# Patient Record
Sex: MALE | Race: ASIAN | NOT HISPANIC OR LATINO | URBAN - METROPOLITAN AREA
[De-identification: names, ages, dates, MRNs, and addresses within clinical notes are randomized per-mention and may not be internally consistent; named-entity substitution may affect disease eponyms.]

---

## 2020-07-29 RX ORDER — AZTREONAM 2 G
1 VIAL (EA) INJECTION
Qty: 0 | Refills: 0 | DISCHARGE
Start: 2020-07-29 | End: 2020-08-05

## 2020-08-02 ENCOUNTER — INPATIENT (INPATIENT)
Facility: HOSPITAL | Age: 85
LOS: 3 days | Discharge: HOME | DRG: 644 | End: 2020-08-06
Attending: HOSPITALIST | Admitting: HOSPITALIST
Payer: MEDICARE

## 2020-08-02 VITALS
WEIGHT: 145.06 LBS | RESPIRATION RATE: 20 BRPM | HEIGHT: 66 IN | SYSTOLIC BLOOD PRESSURE: 164 MMHG | TEMPERATURE: 98 F | HEART RATE: 89 BPM | OXYGEN SATURATION: 97 % | DIASTOLIC BLOOD PRESSURE: 76 MMHG

## 2020-08-02 DIAGNOSIS — I10 ESSENTIAL (PRIMARY) HYPERTENSION: ICD-10-CM

## 2020-08-02 DIAGNOSIS — N40.0 BENIGN PROSTATIC HYPERPLASIA WITHOUT LOWER URINARY TRACT SYMPTOMS: ICD-10-CM

## 2020-08-02 DIAGNOSIS — Z02.9 ENCOUNTER FOR ADMINISTRATIVE EXAMINATIONS, UNSPECIFIED: ICD-10-CM

## 2020-08-02 DIAGNOSIS — E87.1 HYPO-OSMOLALITY AND HYPONATREMIA: ICD-10-CM

## 2020-08-02 DIAGNOSIS — Z29.9 ENCOUNTER FOR PROPHYLACTIC MEASURES, UNSPECIFIED: ICD-10-CM

## 2020-08-02 LAB
ALBUMIN SERPL ELPH-MCNC: 4 G/DL — SIGNIFICANT CHANGE UP (ref 3.3–5)
ALP SERPL-CCNC: 59 U/L — SIGNIFICANT CHANGE UP (ref 40–120)
ALT FLD-CCNC: 14 U/L — SIGNIFICANT CHANGE UP (ref 10–45)
ANION GAP SERPL CALC-SCNC: 15 MMOL/L — SIGNIFICANT CHANGE UP (ref 5–17)
ANION GAP SERPL CALC-SCNC: 16 MMOL/L — SIGNIFICANT CHANGE UP (ref 5–17)
ANION GAP SERPL CALC-SCNC: 17 MMOL/L — SIGNIFICANT CHANGE UP (ref 5–17)
ANION GAP SERPL CALC-SCNC: 17 MMOL/L — SIGNIFICANT CHANGE UP (ref 5–17)
APPEARANCE UR: CLEAR — SIGNIFICANT CHANGE UP
AST SERPL-CCNC: 23 U/L — SIGNIFICANT CHANGE UP (ref 10–40)
BACTERIA # UR AUTO: NEGATIVE — SIGNIFICANT CHANGE UP
BASOPHILS # BLD AUTO: 0.02 K/UL — SIGNIFICANT CHANGE UP (ref 0–0.2)
BASOPHILS NFR BLD AUTO: 0.2 % — SIGNIFICANT CHANGE UP (ref 0–2)
BILIRUB SERPL-MCNC: 0.6 MG/DL — SIGNIFICANT CHANGE UP (ref 0.2–1.2)
BILIRUB UR-MCNC: NEGATIVE — SIGNIFICANT CHANGE UP
BUN SERPL-MCNC: 16 MG/DL — SIGNIFICANT CHANGE UP (ref 7–23)
BUN SERPL-MCNC: 16 MG/DL — SIGNIFICANT CHANGE UP (ref 7–23)
BUN SERPL-MCNC: 17 MG/DL — SIGNIFICANT CHANGE UP (ref 7–23)
BUN SERPL-MCNC: 20 MG/DL — SIGNIFICANT CHANGE UP (ref 7–23)
CALCIUM SERPL-MCNC: 8.3 MG/DL — LOW (ref 8.4–10.5)
CALCIUM SERPL-MCNC: 8.5 MG/DL — SIGNIFICANT CHANGE UP (ref 8.4–10.5)
CALCIUM SERPL-MCNC: 8.7 MG/DL — SIGNIFICANT CHANGE UP (ref 8.4–10.5)
CALCIUM SERPL-MCNC: 8.7 MG/DL — SIGNIFICANT CHANGE UP (ref 8.4–10.5)
CHLORIDE SERPL-SCNC: 80 MMOL/L — LOW (ref 96–108)
CHLORIDE SERPL-SCNC: 81 MMOL/L — LOW (ref 96–108)
CHLORIDE SERPL-SCNC: 85 MMOL/L — LOW (ref 96–108)
CHLORIDE SERPL-SCNC: 87 MMOL/L — LOW (ref 96–108)
CO2 SERPL-SCNC: 16 MMOL/L — LOW (ref 22–31)
CO2 SERPL-SCNC: 17 MMOL/L — LOW (ref 22–31)
CO2 SERPL-SCNC: 17 MMOL/L — LOW (ref 22–31)
CO2 SERPL-SCNC: 18 MMOL/L — LOW (ref 22–31)
COLOR SPEC: SIGNIFICANT CHANGE UP
CREAT SERPL-MCNC: 1.19 MG/DL — SIGNIFICANT CHANGE UP (ref 0.5–1.3)
CREAT SERPL-MCNC: 1.26 MG/DL — SIGNIFICANT CHANGE UP (ref 0.5–1.3)
CREAT SERPL-MCNC: 1.33 MG/DL — HIGH (ref 0.5–1.3)
CREAT SERPL-MCNC: 1.7 MG/DL — HIGH (ref 0.5–1.3)
DIFF PNL FLD: NEGATIVE — SIGNIFICANT CHANGE UP
EOSINOPHIL # BLD AUTO: 0.01 K/UL — SIGNIFICANT CHANGE UP (ref 0–0.5)
EOSINOPHIL NFR BLD AUTO: 0.1 % — SIGNIFICANT CHANGE UP (ref 0–6)
EPI CELLS # UR: 0 /HPF — SIGNIFICANT CHANGE UP
GLUCOSE SERPL-MCNC: 104 MG/DL — HIGH (ref 70–99)
GLUCOSE SERPL-MCNC: 108 MG/DL — HIGH (ref 70–99)
GLUCOSE SERPL-MCNC: 116 MG/DL — HIGH (ref 70–99)
GLUCOSE SERPL-MCNC: 143 MG/DL — HIGH (ref 70–99)
GLUCOSE UR QL: NEGATIVE — SIGNIFICANT CHANGE UP
HCT VFR BLD CALC: 35.9 % — LOW (ref 39–50)
HGB BLD-MCNC: 13.2 G/DL — SIGNIFICANT CHANGE UP (ref 13–17)
HYALINE CASTS # UR AUTO: 0 /LPF — SIGNIFICANT CHANGE UP (ref 0–2)
IMM GRANULOCYTES NFR BLD AUTO: 0.7 % — SIGNIFICANT CHANGE UP (ref 0–1.5)
KETONES UR-MCNC: NEGATIVE — SIGNIFICANT CHANGE UP
LEUKOCYTE ESTERASE UR-ACNC: NEGATIVE — SIGNIFICANT CHANGE UP
LYMPHOCYTES # BLD AUTO: 1.22 K/UL — SIGNIFICANT CHANGE UP (ref 1–3.3)
LYMPHOCYTES # BLD AUTO: 15 % — SIGNIFICANT CHANGE UP (ref 13–44)
MAGNESIUM SERPL-MCNC: 1.6 MG/DL — SIGNIFICANT CHANGE UP (ref 1.6–2.6)
MCHC RBC-ENTMCNC: 31.2 PG — SIGNIFICANT CHANGE UP (ref 27–34)
MCHC RBC-ENTMCNC: 36.8 GM/DL — HIGH (ref 32–36)
MCV RBC AUTO: 84.9 FL — SIGNIFICANT CHANGE UP (ref 80–100)
MONOCYTES # BLD AUTO: 0.58 K/UL — SIGNIFICANT CHANGE UP (ref 0–0.9)
MONOCYTES NFR BLD AUTO: 7.1 % — SIGNIFICANT CHANGE UP (ref 2–14)
NEUTROPHILS # BLD AUTO: 6.23 K/UL — SIGNIFICANT CHANGE UP (ref 1.8–7.4)
NEUTROPHILS NFR BLD AUTO: 76.9 % — SIGNIFICANT CHANGE UP (ref 43–77)
NITRITE UR-MCNC: NEGATIVE — SIGNIFICANT CHANGE UP
NRBC # BLD: 0 /100 WBCS — SIGNIFICANT CHANGE UP (ref 0–0)
OSMOLALITY SERPL: 244 MOSMOL/KG — LOW (ref 280–301)
OSMOLALITY UR: 521 MOS/KG — SIGNIFICANT CHANGE UP (ref 300–900)
PH UR: 7 — SIGNIFICANT CHANGE UP (ref 5–8)
PHOSPHATE SERPL-MCNC: 2.2 MG/DL — LOW (ref 2.5–4.5)
PLATELET # BLD AUTO: 307 K/UL — SIGNIFICANT CHANGE UP (ref 150–400)
POTASSIUM SERPL-MCNC: 4.5 MMOL/L — SIGNIFICANT CHANGE UP (ref 3.5–5.3)
POTASSIUM SERPL-MCNC: 4.6 MMOL/L — SIGNIFICANT CHANGE UP (ref 3.5–5.3)
POTASSIUM SERPL-MCNC: 4.7 MMOL/L — SIGNIFICANT CHANGE UP (ref 3.5–5.3)
POTASSIUM SERPL-MCNC: 4.8 MMOL/L — SIGNIFICANT CHANGE UP (ref 3.5–5.3)
POTASSIUM SERPL-SCNC: 4.5 MMOL/L — SIGNIFICANT CHANGE UP (ref 3.5–5.3)
POTASSIUM SERPL-SCNC: 4.6 MMOL/L — SIGNIFICANT CHANGE UP (ref 3.5–5.3)
POTASSIUM SERPL-SCNC: 4.7 MMOL/L — SIGNIFICANT CHANGE UP (ref 3.5–5.3)
POTASSIUM SERPL-SCNC: 4.8 MMOL/L — SIGNIFICANT CHANGE UP (ref 3.5–5.3)
POTASSIUM UR-SCNC: 46 MMOL/L — SIGNIFICANT CHANGE UP
PROT SERPL-MCNC: 6.7 G/DL — SIGNIFICANT CHANGE UP (ref 6–8.3)
PROT UR-MCNC: SIGNIFICANT CHANGE UP
RBC # BLD: 4.23 M/UL — SIGNIFICANT CHANGE UP (ref 4.2–5.8)
RBC # FLD: 11.3 % — SIGNIFICANT CHANGE UP (ref 10.3–14.5)
RBC CASTS # UR COMP ASSIST: 5 /HPF — HIGH (ref 0–4)
SARS-COV-2 RNA SPEC QL NAA+PROBE: SIGNIFICANT CHANGE UP
SODIUM SERPL-SCNC: 113 MMOL/L — CRITICAL LOW (ref 135–145)
SODIUM SERPL-SCNC: 115 MMOL/L — CRITICAL LOW (ref 135–145)
SODIUM SERPL-SCNC: 118 MMOL/L — CRITICAL LOW (ref 135–145)
SODIUM SERPL-SCNC: 120 MMOL/L — CRITICAL LOW (ref 135–145)
SODIUM UR-SCNC: 128 MMOL/L — SIGNIFICANT CHANGE UP
SP GR SPEC: 1.02 — SIGNIFICANT CHANGE UP (ref 1.01–1.02)
TROPONIN T, HIGH SENSITIVITY RESULT: 10 NG/L — SIGNIFICANT CHANGE UP (ref 0–51)
UROBILINOGEN FLD QL: NEGATIVE — SIGNIFICANT CHANGE UP
WBC # BLD: 8.12 K/UL — SIGNIFICANT CHANGE UP (ref 3.8–10.5)
WBC # FLD AUTO: 8.12 K/UL — SIGNIFICANT CHANGE UP (ref 3.8–10.5)
WBC UR QL: 0 /HPF — SIGNIFICANT CHANGE UP (ref 0–5)

## 2020-08-02 PROCEDURE — 70450 CT HEAD/BRAIN W/O DYE: CPT | Mod: 26

## 2020-08-02 PROCEDURE — 99223 1ST HOSP IP/OBS HIGH 75: CPT | Mod: GC

## 2020-08-02 PROCEDURE — 99291 CRITICAL CARE FIRST HOUR: CPT

## 2020-08-02 PROCEDURE — 93010 ELECTROCARDIOGRAM REPORT: CPT

## 2020-08-02 PROCEDURE — 99222 1ST HOSP IP/OBS MODERATE 55: CPT | Mod: GC

## 2020-08-02 PROCEDURE — 71045 X-RAY EXAM CHEST 1 VIEW: CPT | Mod: 26

## 2020-08-02 RX ORDER — TAMSULOSIN HYDROCHLORIDE 0.4 MG/1
0.4 CAPSULE ORAL AT BEDTIME
Refills: 0 | Status: DISCONTINUED | OUTPATIENT
Start: 2020-08-02 | End: 2020-08-06

## 2020-08-02 RX ORDER — SODIUM CHLORIDE 9 MG/ML
1000 INJECTION INTRAMUSCULAR; INTRAVENOUS; SUBCUTANEOUS ONCE
Refills: 0 | Status: COMPLETED | OUTPATIENT
Start: 2020-08-02 | End: 2020-08-02

## 2020-08-02 RX ORDER — DESMOPRESSIN ACETATE 0.1 MG/1
2 TABLET ORAL ONCE
Refills: 0 | Status: COMPLETED | OUTPATIENT
Start: 2020-08-02 | End: 2020-08-02

## 2020-08-02 RX ORDER — AMLODIPINE BESYLATE 2.5 MG/1
5 TABLET ORAL DAILY
Refills: 0 | Status: DISCONTINUED | OUTPATIENT
Start: 2020-08-02 | End: 2020-08-06

## 2020-08-02 RX ORDER — FINASTERIDE 5 MG/1
5 TABLET, FILM COATED ORAL DAILY
Refills: 0 | Status: DISCONTINUED | OUTPATIENT
Start: 2020-08-02 | End: 2020-08-06

## 2020-08-02 RX ORDER — ONDANSETRON 8 MG/1
4 TABLET, FILM COATED ORAL ONCE
Refills: 0 | Status: COMPLETED | OUTPATIENT
Start: 2020-08-02 | End: 2020-08-02

## 2020-08-02 RX ORDER — ENOXAPARIN SODIUM 100 MG/ML
40 INJECTION SUBCUTANEOUS DAILY
Refills: 0 | Status: DISCONTINUED | OUTPATIENT
Start: 2020-08-02 | End: 2020-08-06

## 2020-08-02 RX ORDER — SODIUM CHLORIDE 9 MG/ML
1000 INJECTION, SOLUTION INTRAVENOUS
Refills: 0 | Status: DISCONTINUED | OUTPATIENT
Start: 2020-08-02 | End: 2020-08-03

## 2020-08-02 RX ORDER — ACETAMINOPHEN 500 MG
650 TABLET ORAL ONCE
Refills: 0 | Status: COMPLETED | OUTPATIENT
Start: 2020-08-02 | End: 2020-08-02

## 2020-08-02 RX ORDER — SENNA PLUS 8.6 MG/1
2 TABLET ORAL AT BEDTIME
Refills: 0 | Status: DISCONTINUED | OUTPATIENT
Start: 2020-08-02 | End: 2020-08-06

## 2020-08-02 RX ORDER — POLYETHYLENE GLYCOL 3350 17 G/17G
17 POWDER, FOR SOLUTION ORAL DAILY
Refills: 0 | Status: DISCONTINUED | OUTPATIENT
Start: 2020-08-02 | End: 2020-08-06

## 2020-08-02 RX ADMIN — DESMOPRESSIN ACETATE 2 MICROGRAM(S): 0.1 TABLET ORAL at 21:33

## 2020-08-02 RX ADMIN — ENOXAPARIN SODIUM 40 MILLIGRAM(S): 100 INJECTION SUBCUTANEOUS at 15:49

## 2020-08-02 RX ADMIN — FINASTERIDE 5 MILLIGRAM(S): 5 TABLET, FILM COATED ORAL at 15:50

## 2020-08-02 RX ADMIN — ONDANSETRON 4 MILLIGRAM(S): 8 TABLET, FILM COATED ORAL at 04:38

## 2020-08-02 RX ADMIN — SODIUM CHLORIDE 200 MILLILITER(S): 9 INJECTION, SOLUTION INTRAVENOUS at 22:41

## 2020-08-02 RX ADMIN — AMLODIPINE BESYLATE 5 MILLIGRAM(S): 2.5 TABLET ORAL at 15:50

## 2020-08-02 RX ADMIN — Medication 650 MILLIGRAM(S): at 05:05

## 2020-08-02 RX ADMIN — TAMSULOSIN HYDROCHLORIDE 0.4 MILLIGRAM(S): 0.4 CAPSULE ORAL at 21:34

## 2020-08-02 RX ADMIN — SODIUM CHLORIDE 1000 MILLILITER(S): 9 INJECTION INTRAMUSCULAR; INTRAVENOUS; SUBCUTANEOUS at 04:38

## 2020-08-02 NOTE — ED PROVIDER NOTE - PROGRESS NOTE DETAILS
Valentin: received emergent call from lab regarding VBG sodium of 109. Pt stable. Waiting for CMP results to confirm. In CT. Valentin: MICU aware of patient and at bedside. Will be checking urine electrolytes and osmolarity to determine underlying nature of hypoNa. Dispo pending treatment and response. Attending Radha:  micu consulted d/t level of hyponatremia, plan to repeat labs, and re evaluate. Resident: Jerardo Doherty Pt signed out to me pending

## 2020-08-02 NOTE — CONSULT NOTE ADULT - ASSESSMENT
82 year old Swedish-speaking male ([place]  [id number]) with a PMH of HTN and BPH presenting with dizziness, nausea and insomnia for about 10 days found to be hyponatremic.    #Hyponatremia - high urine sodium low serum osmolality consistent with SIADH which could be 2/2 nausea vs. urinary retention vs. Bactrim (some case reports). Neuro status is normal. Improved symptoms.   - Fluid Restrict 1L  - BMPs Q4H, would repeat BMP in 4 hours and then assess if needs more IVF (responded well to 1L 113 -> 115) after 1L NS  - Renal Consult  - Work up for urinary retention continue Sellers as drained 900cc   - Would avoid Bactrim if possible      Patient does not require ICU level of care. Please reconsult as needed. Thank You.    d/w attending     Julian Sebastian MD  Internal Medicine PGY-3  MICU Consult 82 year old Amharic-speaking male ([place]  [id number]) with a PMH of HTN and BPH presenting with dizziness, nausea and insomnia for about 10 days found to be hyponatremic.    #Hyponatremia - high urine sodium low serum osmolality consistent with SIADH which could be 2/2 nausea vs. urinary retention vs. Bactrim (some case reports). Neuro status is normal. Improved symptoms.   - Fluid Restrict 1L  - BMPs Q4H, would repeat BMP in 4 hours and then assess if needs more IVF (responded well to 1L 113 -> 115) after 1L NS  - Goal correction rate 6-8 mEq/L over 24 hours  - Recommend renal consult   - Work up for urinary retention continue Sellers as drained 900cc   - Would avoid Bactrim if possible      Patient does not require ICU level of care. Please reconsult as needed. Thank You.    d/w attending     Julian Sebastian MD  Internal Medicine PGY-3  MICU Consult 84 year old Slovak-speaking male ([place]  [id number]) with a PMH of HTN and BPH presenting with dizziness, nausea and insomnia for about 10 days found to be hyponatremic.    #Hyponatremia - high urine sodium low serum osmolality consistent with SIADH which could be 2/2 nausea vs. urinary retention vs. Bactrim (some case reports). Neuro status is normal. Improved symptoms.   - Fluid Restrict 1L  - BMPs Q4H, would repeat BMP in 4 hours and then assess if needs more IVF (responded well to 1L 113 -> 115) after 1L NS  - Goal correction rate 6-8 mEq/L over 24 hours  - Recommend renal consult   - Work up for urinary retention continue Sellers as drained 900cc   - Would avoid Bactrim if possible  - Would do a full med rec (unclear which BP medication he is on)      Patient does not require ICU level of care. Please reconsult as needed. Thank You.    d/w attending     Julian Sebastian MD  Internal Medicine PGY-3  MICU Consult

## 2020-08-02 NOTE — ED ADULT NURSE REASSESSMENT NOTE - NS ED NURSE REASSESS COMMENT FT1
Patient A+OX3, sitting up in stretcher in no apparent distress. NSR on cardiac monitor. Breathing spontaneous and unlabored on RA. Skin warm pink and dry. Reports dizziness. Patient placed in hospital gown, red socks applied. Patient educated that urine sample is needed. MD Pina at bedside to perform ultrasound, approx. 714 cc of urine noted, sutton catheter to be ordered. Patient states he has not voided since approx. 10pm last night. Patient educated and verbalized understanding of call bell use. Call bell within reach. Bed in lowest position, side rails up.

## 2020-08-02 NOTE — CONSULT NOTE ADULT - PROBLEM SELECTOR RECOMMENDATION 9
Hyponatremia euvolemia likely 2/2 SIADH in the setting of nausea, vomiting and bactrim use.  On admission serum sodium 113, serum osmolality 244, Uosm 529, Gina 46 which consistent with SIADH.  Otherwise sCr 1.26 and urinalysis trace protein with rbc.  Patient was given IVF NS with sNa uptrend to 115 mEq.  CXR and CTH showed no acute process (no pulm/cns etiology induced SIADH)    Recommend Hyponatremia euvolemia likely 2/2 SIADH in the setting of nausea, vomiting and bactrim use.  On admission serum sodium 113, serum osmolality 244, Uosm 529, Gina 46 which consistent with SIADH.  Otherwise sCr 1.26 and urinalysis trace protein with rbc.  Patient was given IVF NS with sNa uptrend to 115 mEq.  CXR and CTH showed no acute process (no pulm/cns etiology induced SIADH)    Recommend  Fluid restriction 1L per day  please recheck BMP given increase UOP post sutton  if serum Na >120 will need hypertonic saline 2% (pending repeat lab)  avoid hypotonic fluid including D5W, hold further bactrim, avoid NSAIDs  monitor serum Na q4hrs for now  avoid rapid correction goal 6-8 mEq/24hrs

## 2020-08-02 NOTE — ED ADULT NURSE REASSESSMENT NOTE - NS ED NURSE REASSESS COMMENT FT1
Patient home medications placed in sealed valuable bag and brought to main pharmacy. Patient educated and aware that medications sealed in valuable bag and brought to main pharmacy.

## 2020-08-02 NOTE — H&P ADULT - NSHPSOCIALHISTORY_GEN_ALL_CORE
Lives alone. No tobacco, ETOH use, drug use.  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 Lives alone and is able to perform all ADLs by himself. No tobacco, ETOH use, drug use.  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 Wife passed away 2 years ago, [place]. Lives alone and is able to perform all ADLs by himself.

## 2020-08-02 NOTE — H&P ADULT - NSHPLABSRESULTS_GEN_ALL_CORE
LABS:                          13.2   8.12  )-----------( 307      ( 02 Aug 2020 04:57 )             35.9     08-02    115<LL>  |  81<L>  |  16  ----------------------------<  116<H>  4.8   |  17<L>  |  1.19    Ca    8.3<L>      02 Aug 2020 09:49  Phos  2.2     08-  Mg     1.6     08-    TPro  6.7  /  Alb  4.0  /  TBili  0.6  /  DBili  x   /  AST  23  /  ALT  14  /  AlkPhos  59  08-      Urinalysis Basic - ( 02 Aug 2020 09:49 )    Color: Light Yellow / Appearance: Clear / S.019 / pH: x  Gluc: x / Ketone: Negative  / Bili: Negative / Urobili: Negative   Blood: x / Protein: Trace / Nitrite: Negative   Leuk Esterase: Negative / RBC: 5 /hpf / WBC 0 /HPF   Sq Epi: x / Non Sq Epi: 0 /hpf / Bacteria: Negative      CAPILLARY BLOOD GLUCOSE      POCT Blood Glucose.: 128 mg/dL (02 Aug 2020 05:06)    Osmolality, Random Urine (. @ 09:49)    Osmolality, Random Urine: 521 mos/kg    Sodium, Random Urine (. @ 09:49)    Sodium, Random Urine: 128: Reference Ranges have NOT been established for random urine analytes due  to variability in fluid intake and concentration. mmol/L    Osmolality, Serum (20 @ 10:07)    Osmolality, Serum: 244 mosmol/kg        RADIOLOGY/ADDITIONAL TESTS:    < from: CT Head No Cont (20 @ 05:55) >    IMPRESSION:  Motion limited study. No evidence of acute intracranial process.      < end of copied text >        EKG: LABS:                          13.2   8.12  )-----------( 307      ( 02 Aug 2020 04:57 )             35.9     08-02    115<LL>  |  81<L>  |  16  ----------------------------<  116<H>  4.8   |  17<L>  |  1.19    Ca    8.3<L>      02 Aug 2020 09:49  Phos  2.2     08-  Mg     1.6     08-    TPro  6.7  /  Alb  4.0  /  TBili  0.6  /  DBili  x   /  AST  23  /  ALT  14  /  AlkPhos  59  08-      Urinalysis Basic - ( 02 Aug 2020 09:49 )    Color: Light Yellow / Appearance: Clear / S.019 / pH: x  Gluc: x / Ketone: Negative  / Bili: Negative / Urobili: Negative   Blood: x / Protein: Trace / Nitrite: Negative   Leuk Esterase: Negative / RBC: 5 /hpf / WBC 0 /HPF   Sq Epi: x / Non Sq Epi: 0 /hpf / Bacteria: Negative      CAPILLARY BLOOD GLUCOSE      POCT Blood Glucose.: 128 mg/dL (02 Aug 2020 05:06)    Osmolality, Random Urine (. @ 09:49)    Osmolality, Random Urine: 521 mos/kg    Sodium, Random Urine (. @ 09:49)    Sodium, Random Urine: 128: Reference Ranges have NOT been established for random urine analytes due  to variability in fluid intake and concentration. mmol/L    Osmolality, Serum (20 @ 10:07)    Osmolality, Serum: 244 mosmol/kg        RADIOLOGY/ADDITIONAL TESTS:    < from: CT Head No Cont (20 @ 05:55) >    IMPRESSION:  Motion limited study. No evidence of acute intracranial process.      < end of copied text >      EKG: LABS:                          13.2   8.12  )-----------( 307      ( 02 Aug 2020 04:57 )             35.9     08-02    115<LL>  |  81<L>  |  16  ----------------------------<  116<H>  4.8   |  17<L>  |  1.19    Ca    8.3<L>      02 Aug 2020 09:49  Phos  2.2     08-  Mg     1.6     08-    TPro  6.7  /  Alb  4.0  /  TBili  0.6  /  DBili  x   /  AST  23  /  ALT  14  /  AlkPhos  59  08-      Urinalysis Basic - ( 02 Aug 2020 09:49 )    Color: Light Yellow / Appearance: Clear / S.019 / pH: x  Gluc: x / Ketone: Negative  / Bili: Negative / Urobili: Negative   Blood: x / Protein: Trace / Nitrite: Negative   Leuk Esterase: Negative / RBC: 5 /hpf / WBC 0 /HPF   Sq Epi: x / Non Sq Epi: 0 /hpf / Bacteria: Negative      CAPILLARY BLOOD GLUCOSE      POCT Blood Glucose.: 128 mg/dL (02 Aug 2020 05:06)    Osmolality, Random Urine (20 @ 09:49)    Osmolality, Random Urine: 521 mos/kg    Sodium, Random Urine (20 @ 09:49)    Sodium, Random Urine: 128: Reference Ranges have NOT been established for random urine analytes due  to variability in fluid intake and concentration. mmol/L    Osmolality, Serum (20 @ 10:07)    Osmolality, Serum: 244 mosmol/kg        RADIOLOGY/ADDITIONAL TESTS:    < from: CT Head No Cont (20 @ 05:55) >    IMPRESSION:  Motion limited study. No evidence of acute intracranial process.      < end of copied text >    < from: Xray Chest 1 View AP/PA (20 @ 05:11) >    FINDINGS:  The heart size is unremarkable.  The lungs are clear. No pleural effusion or pneumothorax.  The visualized osseous structures demonstrate no acute pathology.    IMPRESSION:  Clear lungs.    < end of copied text >        EKG:

## 2020-08-02 NOTE — H&P ADULT - ASSESSMENT
85 yo Malay-speaking M PMH of HTN, BPH presenting with dizziness, nausea and insomnia for 10 days, recently prescribed Bactrim by PMD for urinary retention. Found to have Na 113, admitted for hyponatremia. 83 yo Maori-speaking M PMH of HTN, BPH presenting with dizziness, nausea and insomnia for 10 days, recently prescribed Bactrim by PMD for urinary retention. Found to have Na 113, admitted for hyponatremia.

## 2020-08-02 NOTE — ED ADULT NURSE REASSESSMENT NOTE - NS ED NURSE REASSESS COMMENT FT1
pt appears to be in no acute distress. VSS. pt awaiting CT results. pt made aware must provide urine sample, pt understands. safety maintained.

## 2020-08-02 NOTE — H&P ADULT - PROBLEM SELECTOR PLAN 3
Recently prescribed Bactrim by PMD for urinary retention  - on sutton Recently prescribed Bactrim by PMD for urinary retention. Found to be retaining in ED w/ bladder scan 700'sml, sutton placed.  - cont sutton Recently prescribed Bactrim by PMD for urinary retention. Found to be retaining in ED w/ bladder scan 700'sml, sutton placed.  - cont sutton   - on home tamsulosin and finasteride

## 2020-08-02 NOTE — ED PROVIDER NOTE - SEVERE SEPSIS ALERT DETAILS
Lactate elevation unlikely to be due to infection given lack of localizing infectious symptoms or fever.

## 2020-08-02 NOTE — H&P ADULT - NSHPPHYSICALEXAM_GEN_ALL_CORE
Vital Signs Last 24 Hrs  T(C): 36.8 (02 Aug 2020 12:00), Max: 36.9 (02 Aug 2020 03:44)  T(F): 98.3 (02 Aug 2020 12:00), Max: 98.5 (02 Aug 2020 03:44)  HR: 71 (02 Aug 2020 12:00) (71 - 89)  BP: 129/66 (02 Aug 2020 12:00) (123/69 - 164/76)  BP(mean): --  RR: 16 (02 Aug 2020 12:00) (16 - 20)  SpO2: 98% (02 Aug 2020 12:00) (97% - 100%)    PHYSICAL EXAM:  GENERAL: NAD, lying in bed comfortably  HEENT:  NCAT, supple neck  CHEST/LUNG: Clear to auscultation bilaterally; No rales, rhonchi, wheezing, or rubs. Unlabored respirations  HEART: Regular rate and rhythm; S1, S2 present. No murmurs, rubs, or gallops  ABDOMEN: Bowel sounds present; Soft, Nontender, Nondistended.   EXTREMITIES:  No pedal edema  NERVOUS SYSTEM:  Alert & Oriented X3, speech clear. No deficits   MSK: FROM all 4 extremities, full and equal strength

## 2020-08-02 NOTE — H&P ADULT - PROBLEM SELECTOR PLAN 1
Na 113 s/p 1L NS in ED, now 115. Likely 2/2 SIADH given high urine sodium, high urine osmol and low serum osmol, possibly in setting of recent rx Bactrim.  - fluid restrict 1L. Goal correction rate 6-8 mEq/L over 24 hrs.   - Monitor BMP  - renal consult, appreciate recs Na 113 s/p 1L NS in ED, now 115. Likely 2/2 SIADH given high urine sodium, high urine osmol and low serum osmol, possibly in setting of recent rx Bactrim.  - fluid restrict 1L. Goal correction rate 6-8 mEq/L over 24 hrs.   - Monitor BMP  - renal consulted, appreciate recs Na 113 s/p 1L NS in ED, now 115. Likely mulfactorial 2/2 diuretics vs. SIADH given high urine sodium, high urine osmol and low serum osmol, possibly in setting of recent rx Bactrim.  - fluid restrict 1L. Goal correction rate 6-8 mEq/L over 24 hrs.   - Monitor BMP q6  - renal consulted, appreciate recs

## 2020-08-02 NOTE — H&P ADULT - HISTORY OF PRESENT ILLNESS
83 yo Yi-speaking male ([place]  ) with PMH HTN, BPH presenting with dizziness, nausea and insomnia for 10 days duration. Also reports a 6/10 intermittent headache bilaterally on the frontal side. Has had some nausea with one episode of NBNB vomiting. Dizziness reports "as if he is spinning" but denies any other central symptoms such as tinnitus or hearing loss. Denies fevers/chills, chest pain, shortness of breath or abdominal pain. Of note he had difficulty urinating and PMD started patient on Bactrim. Denies dysuria. Has had decreased PO intake due to the nausea but has been drinking 3-4 bottles of water everyday.     ED Course:  98.5F, HR 89, 164/76, RR 20, SpO2 97% RA  s/p 1L NS bolus, Na 113 -->115  given zofran and tylenol 85 yo Urdu-speaking male (RN provided translation) with PMH HTN, BPH presenting with dizziness, nausea and insomnia for 10 days duration. Also reports a 6/10 intermittent headache bilaterally on the frontal side. Has had some nausea with one episode of NBNB vomiting. Dizziness reports "as if he is spinning" but denies any tinnitus or hearing loss. Denies fevers/chills, chest pain, shortness of breath or abdominal pain. Of note he had difficulty urinating and PMD started patient on Bactrim 2 days ago. Accidently took 2 doses yesterday. Denies dysuria, reports voiding every 2 hrs while at home. Has had decreased PO intake due to the nausea but has been drinking 3-4 [employer] bottles everyday. Pt now reports headache and nausea have improved.    ED Course:  98.5F, HR 89, 164/76, RR 20, SpO2 97% RA  s/p 1L NS bolus, Na 113 -->115  given zofran and tylenol 85 yo Vietnamese-speaking male (RN provided translation) with PMH HTN, BPH presenting with dizziness, nausea and insomnia for 10 days duration. Also reports a 6/10 intermittent headache bilaterally on the frontal side. Has had some nausea with one episode of NBNB vomiting. Dizziness reports "as if he is spinning" but denies any tinnitus or hearing loss. Denies fevers/chills, chest pain, shortness of breath or abdominal pain. Of note he had difficulty urinating and PMD started patient on Bactrim 2 days ago. Accidently took 2 doses yesterday. Denies any past hospitalizations. Denies dysuria, reports voiding every 2 hrs while at home. Has had decreased PO intake due to the nausea but has been drinking 3-4 [employer] spring bottles everyday. Pt now reports headache and nausea have improved.    ED Course:  98.5F, HR 89, 164/76, RR 20, SpO2 97% RA  Found to be hyponatremic to 113, s/p 1L NS bolus, Na 113 -->115  given zofran and tylenol

## 2020-08-02 NOTE — H&P ADULT - PROBLEM SELECTOR PLAN 2
Was taking losartan-HCTZ at home  - hold thiazide for now given hyponatremia   - on amlodipine 5mg daily  - Monitor BP

## 2020-08-02 NOTE — H&P ADULT - NSHPREVIEWOFSYSTEMS_GEN_ALL_CORE
CONSTITUTIONAL: No fever, no chills  EYES: No eye pain, no visual disturbance  Mouth: no pain in mouth, no cuts  RESPIRATORY: No cough, No sob  CARDIOVASCULAR: No CP, no palpitations  GASTROINTESTINAL: no abdominal pain, no n/v/d  GENITOURINARY: No dysuria, no hematuria  NEUROLOGICAL: No headaches, no weakness  SKIN: No itching, no rashes  MUSCULOSKELETAL: No joint pain, no joint swelling CONSTITUTIONAL: No fever, no chills  RESPIRATORY: No cough, No sob  CARDIOVASCULAR: No CP, no palpitations  GASTROINTESTINAL: no abdominal pain  GENITOURINARY: No dysuria, no hematuria  MUSCULOSKELETAL: No joint pain, no joint swelling

## 2020-08-02 NOTE — CONSULT NOTE ADULT - SUBJECTIVE AND OBJECTIVE BOX
CHIEF COMPLAINT:    HPI:    PAST MEDICAL & SURGICAL HISTORY:  HTN (hypertension)      FAMILY HISTORY:      SOCIAL HISTORY:  Smoking: __ packs x ___ years  EtOH Use:  Marital Status:  Occupation:  Recent Travel:  Country of Birth:  Advance Directives:    Allergies    No Known Allergies    Intolerances        HOME MEDICATIONS:    REVIEW OF SYSTEMS:  Constitutional:   Eyes:  ENT:  CV:  Resp:  GI:  :  MSK:  Integumentary:  Neurological:  Psychiatric:  Endocrine:  Hematologic/Lymphatic:  Allergic/Immunologic:  [ ] All other systems negative  [ ] Unable to assess ROS because ________    OBJECTIVE:  ICU Vital Signs Last 24 Hrs  T(C): 36.9 (02 Aug 2020 07:16), Max: 36.9 (02 Aug 2020 03:44)  T(F): 98.4 (02 Aug 2020 07:16), Max: 98.5 (02 Aug 2020 03:44)  HR: 72 (02 Aug 2020 10:00) (72 - 89)  BP: 123/69 (02 Aug 2020 10:00) (123/69 - 164/76)  BP(mean): --  ABP: --  ABP(mean): --  RR: 17 (02 Aug 2020 10:00) (16 - 20)  SpO2: 97% (02 Aug 2020 10:00) (97% - 100%)        CAPILLARY BLOOD GLUCOSE      POCT Blood Glucose.: 128 mg/dL (02 Aug 2020 05:06)      PHYSICAL EXAM:  General:   HEENT:   Lymph Nodes:  Neck:   Respiratory:   Cardiovascular:   Abdomen:   Extremities:   Skin:   Neurological:  Psychiatry:    HOSPITAL MEDICATIONS:  MEDICATIONS  (STANDING):    MEDICATIONS  (PRN):      LABS:                        13.2   8.12  )-----------( 307      ( 02 Aug 2020 04:57 )             35.9     08-02    115<LL>  |  81<L>  |  16  ----------------------------<  116<H>  4.8   |  17<L>  |  1.19    Ca    8.3<L>      02 Aug 2020 09:49  Phos  2.2     08-02  Mg     1.6     08-02    TPro  6.7  /  Alb  4.0  /  TBili  0.6  /  DBili  x   /  AST  23  /  ALT  14  /  AlkPhos  59  08-02      Urinalysis Basic - ( 02 Aug 2020 09:49 )    Color: Light Yellow / Appearance: Clear / S.019 / pH: x  Gluc: x / Ketone: Negative  / Bili: Negative / Urobili: Negative   Blood: x / Protein: Trace / Nitrite: Negative   Leuk Esterase: Negative / RBC: 5 /hpf / WBC 0 /HPF   Sq Epi: x / Non Sq Epi: 0 /hpf / Bacteria: Negative        Venous Blood Gas:   @ 05:03  7.41/34/40/21/74  VBG Lactate: 2.8      MICROBIOLOGY:     RADIOLOGY:  [ ] Reviewed and interpreted by me    EKG: HPI:  Mr. Chen is a 82 year old Upper sorbian-speaking male ([place]  [id number]) with a PMH of HTN and BPH presenting with dizziness, nausea and insomnia for about 10 days. Patient reports has also been having a 6/10 headache bilaterally on the frontal side that is intermittent. Has had some nausea with one episode of NBNB vomiting. Dizziness reports is "as if he is spinning" but denies any other central symptoms such as tinnitus or hearing loss. Denies fevers/chills, chest pain, shortness of breath or abdominal pain. Has also had difficulty urinating PMD started patient on Bactrim. Denies dysuria. Has had decreased PO intake due to the nausea but has been drinking 3-4 bottles of water everyday. Denies history of heart failure or diuretic use. No history of seizures. Lives home alone. For home medications on Finasteride, Bactrim currently, and Ambien. Unsure which BP medication he takes.        PAST MEDICAL & SURGICAL HISTORY:  HTN (hypertension)  BPH    No surgical history      FAMILY HISTORY:      SOCIAL HISTORY:  Smoking: Denies  EtOH Use: Denies  Marital Status:  Occupation:  Recent Travel:  Country of Birth:  Advance Directives:    Allergies    No Known Allergies    Intolerances        HOME MEDICATIONS:  Finasteride  Ambien  BP med?    REVIEW OF SYSTEMS:  Constitutional: no fevers/chills   Eyes:  ENT:  CV: no chest pain or palpitations  Resp: no cough or shortness of breath  GI: +nausea no abdominal pain  : +difficulty with urinating no dysuria   MSK:  Integumentary:  Neurological: +headache +dizziness   Psychiatric:  Endocrine: no weight loss or weight gain  Hematologic/Lymphatic:  Allergic/Immunologic:  [X ] All other systems negative  [ ] Unable to assess ROS because ________    OBJECTIVE:  ICU Vital Signs Last 24 Hrs  T(C): 36.9 (02 Aug 2020 07:16), Max: 36.9 (02 Aug 2020 03:44)  T(F): 98.4 (02 Aug 2020 07:16), Max: 98.5 (02 Aug 2020 03:44)  HR: 72 (02 Aug 2020 10:00) (72 - 89)  BP: 123/69 (02 Aug 2020 10:00) (123/69 - 164/76)  BP(mean): --  ABP: --  ABP(mean): --  RR: 17 (02 Aug 2020 10:00) (16 - 20)  SpO2: 97% (02 Aug 2020 10:00) (97% - 100%)        CAPILLARY BLOOD GLUCOSE      POCT Blood Glucose.: 128 mg/dL (02 Aug 2020 05:06)      PHYSICAL EXAM:  General: NAD  HEENT: NC/AT, EOMI   Neck: Supple   Respiratory:   Cardiovascular:   Abdomen:   Extremities:   Skin:   Neurological:  Psychiatry:    HOSPITAL MEDICATIONS:  MEDICATIONS  (STANDING):    MEDICATIONS  (PRN):      LABS:                        13.2   8.12  )-----------( 307      ( 02 Aug 2020 04:57 )             35.9     08-02    115<LL>  |  81<L>  |  16  ----------------------------<  116<H>  4.8   |  17<L>  |  1.19    Ca    8.3<L>      02 Aug 2020 09:49  Phos  2.2     08-  Mg     1.6     08-02    TPro  6.7  /  Alb  4.0  /  TBili  0.6  /  DBili  x   /  AST  23  /  ALT  14  /  AlkPhos  59  08-02      Urinalysis Basic - ( 02 Aug 2020 09:49 )    Color: Light Yellow / Appearance: Clear / S.019 / pH: x  Gluc: x / Ketone: Negative  / Bili: Negative / Urobili: Negative   Blood: x / Protein: Trace / Nitrite: Negative   Leuk Esterase: Negative / RBC: 5 /hpf / WBC 0 /HPF   Sq Epi: x / Non Sq Epi: 0 /hpf / Bacteria: Negative        Venous Blood Gas:   @ 05:03  7.41/34/40/21/74  VBG Lactate: 2.8      MICROBIOLOGY:     RADIOLOGY:  [ ] Reviewed and interpreted by me    EKG: HPI:  Mr. Chen is a 82 year old Albanian-speaking male ([place]  [id number]) with a PMH of HTN and BPH presenting with dizziness, nausea and insomnia for about 10 days. Patient reports has also been having a 6/10 headache bilaterally on the frontal side that is intermittent. Has had some nausea with one episode of NBNB vomiting. Dizziness reports is "as if he is spinning" but denies any other central symptoms such as tinnitus or hearing loss. Denies fevers/chills, chest pain, shortness of breath or abdominal pain. Has also had difficulty urinating PMD started patient on Bactrim. Denies dysuria. Has had decreased PO intake due to the nausea but has been drinking 3-4 bottles of water everyday. Denies history of heart failure or diuretic use. No history of seizures. Lives home alone. For home medications on Finasteride, Bactrim currently, and Ambien. Unsure which BP medication he takes.        PAST MEDICAL & SURGICAL HISTORY:  HTN (hypertension)  BPH    No surgical history      FAMILY HISTORY:      SOCIAL HISTORY:  Smoking: Denies  EtOH Use: Denies  Marital Status:  Occupation:  Recent Travel:  Country of Birth:  Advance Directives:    Allergies    No Known Allergies    Intolerances        HOME MEDICATIONS:  Finasteride  Ambien  BP med?    REVIEW OF SYSTEMS:  Constitutional: no fevers/chills   Eyes:  ENT:  CV: no chest pain or palpitations  Resp: no cough or shortness of breath  GI: +nausea no abdominal pain  : +difficulty with urinating no dysuria   MSK:  Integumentary:  Neurological: +headache +dizziness   Psychiatric:  Endocrine: no weight loss or weight gain  Hematologic/Lymphatic:  Allergic/Immunologic:  [X ] All other systems negative  [ ] Unable to assess ROS because ________    OBJECTIVE:  ICU Vital Signs Last 24 Hrs  T(C): 36.9 (02 Aug 2020 07:16), Max: 36.9 (02 Aug 2020 03:44)  T(F): 98.4 (02 Aug 2020 07:16), Max: 98.5 (02 Aug 2020 03:44)  HR: 72 (02 Aug 2020 10:00) (72 - 89)  BP: 123/69 (02 Aug 2020 10:00) (123/69 - 164/76)  BP(mean): --  ABP: --  ABP(mean): --  RR: 17 (02 Aug 2020 10:00) (16 - 20)  SpO2: 97% (02 Aug 2020 10:00) (97% - 100%)        CAPILLARY BLOOD GLUCOSE      POCT Blood Glucose.: 128 mg/dL (02 Aug 2020 05:06)      PHYSICAL EXAM:  General: NAD  HEENT: NC/AT, EOMI   Neck: Supple   Respiratory: CTA B/L   Cardiovascular: RRR S1+ S2+ No murmurs  Abdomen: Soft nontender/nondistended good bowel sounds  : +Sellers   Extremities: 2+ peripheral pulses; no LE edema  Skin: No rash  Neurological: A/O x 3 (name, location, date). No focal deficits   Psychiatry: [facility] MEDICATIONS:  MEDICATIONS  (STANDING):    MEDICATIONS  (PRN):      LABS:                        13.2   8.12  )-----------( 307      ( 02 Aug 2020 04:57 )             35.9     08-02    115<LL>  |  81<L>  |  16  ----------------------------<  116<H>  4.8   |  17<L>  |  1.19    Ca    8.3<L>      02 Aug 2020 09:49  Phos  2.2     08-02  Mg     1.6     08-02    TPro  6.7  /  Alb  4.0  /  TBili  0.6  /  DBili  x   /  AST  23  /  ALT  14  /  AlkPhos  59  08-02      Urinalysis Basic - ( 02 Aug 2020 09:49 )    Color: Light Yellow / Appearance: Clear / S.019 / pH: x  Gluc: x / Ketone: Negative  / Bili: Negative / Urobili: Negative   Blood: x / Protein: Trace / Nitrite: Negative   Leuk Esterase: Negative / RBC: 5 /hpf / WBC 0 /HPF   Sq Epi: x / Non Sq Epi: 0 /hpf / Bacteria: Negative        Venous Blood Gas:   @ 05:03  7.41/34/40/21/74  VBG Lactate: 2.8      MICROBIOLOGY:     RADIOLOGY:  [ X] Reviewed and interpreted by me HPI:  Mr. Chen is a 84 year old Chinese-speaking male ([place]  [id number]) with a PMH of HTN and BPH presenting with dizziness, nausea and insomnia for about 10 days. Patient reports has also been having a 6/10 headache bilaterally on the frontal side that is intermittent. Has had some nausea with one episode of NBNB vomiting. Dizziness reports is "as if he is spinning" but denies any other central symptoms such as tinnitus or hearing loss. Denies fevers/chills, chest pain, shortness of breath or abdominal pain. Has also had difficulty urinating PMD started patient on Bactrim. Denies dysuria. Has had decreased PO intake due to the nausea but has been drinking 3-4 bottles of water everyday. Denies history of heart failure or diuretic use. No history of seizures. Lives home alone. For home medications on Finasteride, Bactrim currently, and Ambien. Unsure which BP medication he takes.        PAST MEDICAL & SURGICAL HISTORY:  HTN (hypertension)  BPH    No surgical history      FAMILY HISTORY:      SOCIAL HISTORY:  Smoking: Denies  EtOH Use: Denies  Marital Status:  Occupation:  Recent Travel:  Country of Birth:  Advance Directives:    Allergies    No Known Allergies    Intolerances        HOME MEDICATIONS:  Finasteride  Ambien  BP med?    REVIEW OF SYSTEMS:  Constitutional: no fevers/chills   Eyes:  ENT:  CV: no chest pain or palpitations  Resp: no cough or shortness of breath  GI: +nausea no abdominal pain  : +difficulty with urinating no dysuria   MSK:  Integumentary:  Neurological: +headache +dizziness   Psychiatric:  Endocrine: no weight loss or weight gain  Hematologic/Lymphatic:  Allergic/Immunologic:  [X ] All other systems negative  [ ] Unable to assess ROS because ________    OBJECTIVE:  ICU Vital Signs Last 24 Hrs  T(C): 36.9 (02 Aug 2020 07:16), Max: 36.9 (02 Aug 2020 03:44)  T(F): 98.4 (02 Aug 2020 07:16), Max: 98.5 (02 Aug 2020 03:44)  HR: 72 (02 Aug 2020 10:00) (72 - 89)  BP: 123/69 (02 Aug 2020 10:00) (123/69 - 164/76)  BP(mean): --  ABP: --  ABP(mean): --  RR: 17 (02 Aug 2020 10:00) (16 - 20)  SpO2: 97% (02 Aug 2020 10:00) (97% - 100%)        CAPILLARY BLOOD GLUCOSE      POCT Blood Glucose.: 128 mg/dL (02 Aug 2020 05:06)      PHYSICAL EXAM:  General: NAD  HEENT: NC/AT, EOMI   Neck: Supple   Respiratory: CTA B/L   Cardiovascular: RRR S1+ S2+ No murmurs  Abdomen: Soft nontender/nondistended good bowel sounds  : +Sellers   Extremities: 2+ peripheral pulses; no LE edema  Skin: No rash  Neurological: A/O x 3 (name, location, date). No focal deficits   Psychiatry: [facility] MEDICATIONS:  MEDICATIONS  (STANDING):    MEDICATIONS  (PRN):      LABS:                        13.2   8.12  )-----------( 307      ( 02 Aug 2020 04:57 )             35.9     08-02    115<LL>  |  81<L>  |  16  ----------------------------<  116<H>  4.8   |  17<L>  |  1.19    Ca    8.3<L>      02 Aug 2020 09:49  Phos  2.2     08-02  Mg     1.6     08-02    TPro  6.7  /  Alb  4.0  /  TBili  0.6  /  DBili  x   /  AST  23  /  ALT  14  /  AlkPhos  59  08-02      Urinalysis Basic - ( 02 Aug 2020 09:49 )    Color: Light Yellow / Appearance: Clear / S.019 / pH: x  Gluc: x / Ketone: Negative  / Bili: Negative / Urobili: Negative   Blood: x / Protein: Trace / Nitrite: Negative   Leuk Esterase: Negative / RBC: 5 /hpf / WBC 0 /HPF   Sq Epi: x / Non Sq Epi: 0 /hpf / Bacteria: Negative        Venous Blood Gas:   @ 05:03  7.41/34/40/21/74  VBG Lactate: 2.8      MICROBIOLOGY:     RADIOLOGY:  [ X] Reviewed and interpreted by me

## 2020-08-02 NOTE — ED PROVIDER NOTE - NS ED ROS FT
Constitutional: (-) fever (+) vomiting  Eyes/ENT: (-) vision changes, (-) hearing changes  Cardiovascular: (-) chest pain, (-) wheezing  Respiratory: (-) cough, (-) shortness of breath  Gastrointestinal: (+) vomiting, (-) diarrhea, (-) abdominal pain  : (-) dysuria   Musculoskeletal: (-) back pain  Integumentary: (-) rash, (-) edema  Neurological: (-)loc  Allergic/Immunologic: (-) pruritus  Endocrine: No history of thyroid disease

## 2020-08-02 NOTE — ED ADULT NURSE NOTE - OBJECTIVE STATEMENT
84 YOM A&OX3 Telugu speaking only with pmh of HTN & BPH presents to ED for dizziness and headache of 2 days. pt states was started on ambien 1 week ago and has been feeling dizziness from medication. pt complains of headache rated 6/10. pt states has never fell or hit head while taking ambien. upon assessment pt able to move all 4 extremities, pulses presents equal and presents in all 4 extremities, pt able to smile, no facial droop noted, pupils PERRL +3 bilaterally.  pt denies sob, chest pain, n/v/d, blurry vision. safety maintained.

## 2020-08-02 NOTE — ED PROVIDER NOTE - PHYSICAL EXAMINATION
Vitals: I have reviewed the patients vital signs  General: well appearing, well nourished, no acute distress  HEENT: atraumatic, normocephalic, airway patent, EOMI and appropriate tracking  Neck: no JVD, no tracheal deviation, no goiter  Chest/Lungs: no trauma, symmetric chest rise, lung sounds clear bilaterally, speaking in complete sentences  Heart: Regular rate, regular rhythm, S1S2, no MRG, skin well perfused  Abdomen: Soft and nontender throughout, no pulsatile mass, no bruising  Neuro: A+Ox3, CN II-XII intact, speech coherent and non dysarthric, finger-nose testing normal. Muscle strength at baseline in all extremities  Eyes: PERRL, EOMI  MSK: all limbs at baseline strength, no wasting or atrophy, noted muscle twitching on bilateral quadriceps, pt states is normal for him.   Skin: no bleeding, no cyanosis, no new emergent lesions

## 2020-08-02 NOTE — ED PROVIDER NOTE - ATTENDING CONTRIBUTION TO CARE
MD Garcia:  patient seen and evaluated personally.   I agree with the History & Physical,  Impression & Plan other than what was detailed in my note.  MD Garcia  85 y/o m presnting w/ 2 weeks of lightheadedness, now having nausea, and vomiting, also pos ha, no f/c, unilateral weakness, no abd pain, urinary symptoms, weight changes. afebrile vitals stable, no distress, pleassant, power 5/5x 4, cn 2-xii gi, no cerebellar dysfunction. mildly dry appearing. plan for ct head, cbc, cmp, urinalysis, ekg. trop re evaluate.

## 2020-08-02 NOTE — ED PROVIDER NOTE - CLINICAL SUMMARY MEDICAL DECISION MAKING FREE TEXT BOX
83 y/o Sami speaking male hx HTN with 2 weeks of persistent lightheadedness, N/V, and headache. Not triggered or relieved by anything in particular. Exam nonfocal for lateralizing lesions, minimal concern for posterior circulation stroke given symptoms and exam. Etiology unsure, will check cbc, cmp, vbg, ekg, cxr, ct head to rule out structural, metabolic, or other causes. Disposition pending results. Attempt at symptomatic relief with NS and zofran. Reassess.    Translation provided via video service on tablet.    states pt poor historian, alert and oriented but not fully understanding questions regarding his PMH

## 2020-08-02 NOTE — CONSULT NOTE ADULT - SUBJECTIVE AND OBJECTIVE BOX
[facility] DIVISION OF KIDNEY DISEASES AND HYPERTENSION   -- INITIAL CONSULT NOTE --  Efe Lua  Nephrology Fellow  NS Pager: [phone] / DELMA Pager: [phone]  After 5pm or on weekend, please page the on-call fellow)  --------------------------------------------------------------------------------    HPI:  84M Wolof-speaking ([place] ) with PMHx HTN and BPH who present to ED for dizziness, nausea for 10 days - admitted for hyponatremia (115) with lab consistent for SIADH.    Patient report symptoms started about 10 days ago and are accompanied by insomnia, headache (b/l frontal, intermittent), NBNB vomiting and decrease PO intake.  Pt was recently prescribed bactrim for difficulty urination by PMD.  Otherwise pt denies any fever, chills, chest pain, sob, abdominal pain, excess fluid intake, diuretic, diabetic meds (sulfonylurea).      Nephrology consulted for hyponatremia.  On admission serum sodium 113, serum osmolality 244, Uosm 529, Gina 46 which consistent with SIADH.  Otherwise sCr 1.26, serum glucose 140s and urinalysis trace protein with rbc.  Patient was given IVF NS with sNa uptrend to 115 mEq.  CXR and CTH showed no acute process.  MICU consulted however deemed doesn't require ICU care.      PAST HISTORY  --------------------------------------------------------------------------------  PAST MEDICAL & SURGICAL HISTORY:  BPH (benign prostatic hyperplasia)  HTN (hypertension)  No significant past surgical history    FAMILY HISTORY:  Family history unknown    PAST SOCIAL HISTORY:  ALLERGIES & MEDICATIONS  --------------------------------------------------------------------------------  Allergies    No Known Allergies    Intolerances    Standing Inpatient Medications    PRN Inpatient Medications    REVIEW OF SYSTEMS  All other systems were reviewed and are negative, except as noted.    VITALS/PHYSICAL EXAM  --------------------------------------------------------------------------------  T(C): 37.1 (08-02-20 @ 14:11), Max: 37.1 (08-02-20 @ 14:11)  HR: 76 (08-02-20 @ 14:11) (71 - 89)  BP: 138/72 (08-02-20 @ 14:11) (123/69 - 164/76)  RR: 20 (08-02-20 @ 14:11) (16 - 20)  SpO2: 98% (08-02-20 @ 12:00) (97% - 100%)  Wt(kg): --  Height (cm): 168 (08-02-20 @ 14:11)  Weight (kg): 65.7 (08-02-20 @ 14:11)  BMI (kg/m2): 23.3 (08-02-20 @ 14:11)  BSA (m2): 1.75 (08-02-20 @ 14:11)    Physical Exam:        LABS/STUDIES  --------------------------------------------------------------------------------                13.2   8.12  >-----------<  307      [08-02-20 @ 04:57]              35.9     115  |  81  |  16  ----------------------------<  116      [08-02-20 @ 09:49]  4.8   |  17  |  1.19        Ca     8.3     [08-02-20 @ 09:49]      Mg     1.6     [08-02-20 @ 04:57]      Phos  2.2     [08-02-20 @ 04:57]    TPro  6.7  /  Alb  4.0  /  TBili  0.6  /  DBili  x   /  AST  23  /  ALT  14  /  AlkPhos  59  [08-02-20 @ 04:57]    Serum Osmolality 244      [08-02-20 @ 10:07]    Creatinine Trend:  SCr 1.19 [08-02 @ 09:49]  SCr 1.26 [08-02 @ 04:57]    Urinalysis - [08-02-20 @ 09:49]      Color Light Yellow / Appearance Clear / SG 1.019 / pH 7.0      Gluc Negative / Ketone Negative  / Bili Negative / Urobili Negative       Blood Negative / Protein Trace / Leuk Est Negative / Nitrite Negative      RBC 5 / WBC 0 / Hyaline 0 / Gran  / Sq Epi  / Non Sq Epi 0 / Bacteria Negative    Urine Sodium 128      [08-02-20 @ 09:49]  Urine Potassium 46      [08-02-20 @ 09:49]  Urine Osmolality 521      [08-02-20 @ 09:49] [facility] DIVISION OF KIDNEY DISEASES AND HYPERTENSION   -- INITIAL CONSULT NOTE --  Efe Lau  Nephrology Fellow  NS Pager: [phone] / DELMA Pager: [phone]  After 5pm or on weekend, please page the on-call fellow)  --------------------------------------------------------------------------------    HPI:  84M Turkish-speaking ([place]  and RN) with PMHx HTN and BPH who present to ED for dizziness, nausea for 10 days - admitted for hyponatremia (115) with lab consistent for SIADH.    Patient report symptoms started about 10 days ago and are accompanied by insomnia, headache (b/l frontal, intermittent), NBNB vomiting and decrease PO intake.  Pt was recently prescribed bactrim for difficulty urination by PMD.  Otherwise pt denies any fever, chills, chest pain, sob, abdominal pain, excess fluid intake, diuretic, diabetic meds (sulfonylurea).      Nephrology consulted for hyponatremia.  On admission serum sodium 113, serum osmolality 244, Uosm 529, Gina 46 which consistent with SIADH.  Otherwise sCr 1.26, serum glucose 140s and urinalysis trace protein with rbc.  Patient was given IVF NS with sNa uptrend to 115 mEq.  CXR and CTH showed no acute process.  MICU consulted however deemed doesn't require ICU care.      Pt seen and examined at bedside currently asymptomatic report headache/dizziness resolve.    PAST HISTORY  --------------------------------------------------------------------------------  PAST MEDICAL & SURGICAL HISTORY:  BPH (benign prostatic hyperplasia)  HTN (hypertension)  No significant past surgical history    FAMILY HISTORY:  Family history unknown    PAST SOCIAL HISTORY:  ALLERGIES & MEDICATIONS  --------------------------------------------------------------------------------  Allergies    No Known Allergies    Intolerances    Standing Inpatient Medications    PRN Inpatient Medications    REVIEW OF SYSTEMS  Gen: no fever, chills, weakness  Respiratory: No dyspnea, cough  CV: No chest pain, orthopnea  GI: No abdominal pain, nausea, vomiting, diarrhea  MSK: no edema  Neuro: No dizziness, lightheadedness  Heme: No bleeding  All other systems were reviewed and are negative, except as noted.    VITALS/PHYSICAL EXAM  --------------------------------------------------------------------------------  T(C): 37.1 (08-02-20 @ 14:11), Max: 37.1 (08-02-20 @ 14:11)  HR: 76 (08-02-20 @ 14:11) (71 - 89)  BP: 138/72 (08-02-20 @ 14:11) (123/69 - 164/76)  RR: 20 (08-02-20 @ 14:11) (16 - 20)  SpO2: 98% (08-02-20 @ 12:00) (97% - 100%)  Wt(kg): --  Height (cm): 168 (08-02-20 @ 14:11)  Weight (kg): 65.7 (08-02-20 @ 14:11)  BMI (kg/m2): 23.3 (08-02-20 @ 14:11)  BSA (m2): 1.75 (08-02-20 @ 14:11)    Physical Exam:  	Gen: NAD, well-appearing on room air  	HEENT: moist mucous membrane, no JVD, sclera clear  	Pulm: CTA B/L, no crackles  	CV: RRR, S1S2; no rub/murmur  	GI: +BS, soft, nontender/nondistended  	: sutton catheter w/ clear urine  	MSK: Warm, no edema              Neuro: AAOx3  	Psych: Normal affect and mood  	Skin: Warm, no cyanosis    LABS/STUDIES  --------------------------------------------------------------------------------                13.2   8.12  >-----------<  307      [08-02-20 @ 04:57]              35.9     115  |  81  |  16  ----------------------------<  116      [08-02-20 @ 09:49]  4.8   |  17  |  1.19        Ca     8.3     [08-02-20 @ 09:49]      Mg     1.6     [08-02-20 @ 04:57]      Phos  2.2     [08-02-20 @ 04:57]    TPro  6.7  /  Alb  4.0  /  TBili  0.6  /  DBili  x   /  AST  23  /  ALT  14  /  AlkPhos  59  [08-02-20 @ 04:57]    Serum Osmolality 244      [08-02-20 @ 10:07]    Creatinine Trend:  SCr 1.19 [08-02 @ 09:49]  SCr 1.26 [08-02 @ 04:57]    Urinalysis - [08-02-20 @ 09:49]      Color Light Yellow / Appearance Clear / SG 1.019 / pH 7.0      Gluc Negative / Ketone Negative  / Bili Negative / Urobili Negative       Blood Negative / Protein Trace / Leuk Est Negative / Nitrite Negative      RBC 5 / WBC 0 / Hyaline 0 / Gran  / Sq Epi  / Non Sq Epi 0 / Bacteria Negative    Urine Sodium 128      [08-02-20 @ 09:49]  Urine Potassium 46      [08-02-20 @ 09:49]  Urine Osmolality 521      [08-02-20 @ 09:49]

## 2020-08-02 NOTE — ED ADULT NURSE NOTE - NS ED NURSE REPORT GIVEN TO FT
5 julio RN Insook, receiving RN Insook aware that patient home medications were placed in valuable bag and brought to main pharmacy

## 2020-08-02 NOTE — H&P ADULT - ATTENDING COMMENTS
83 yo Turkish-speaking M PMH of HTN, BPH presenting with dizziness, nausea and insomnia for 10 days, recently prescribed Bactrim by PMD for urinary retention. 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 Pt with mild improvement from normal saline.     PLAN:  -pt appears clinically well, asymptomatic at this time  -close monitoring of BMP with Na q4 h at this time  -fluid restriction to 1 L  -4pm Na of 118 improved from 113. Goal for first 24 hours is 119-121. Will withhold adding hypertonic saline for now to avoid overcorrection and follow up 8pm BMP. May ultimately require addition of hypertonic saline  -appreciate nephro cs.    Plan discussed with pt and his son at bedside.

## 2020-08-02 NOTE — ED ADULT NURSE REASSESSMENT NOTE - NS ED NURSE REASSESS COMMENT FT1
Sellers catheter placed by ESTRELLITA Hernandez per verbal order by MD Pina. successful on first attempt. Urine sample sent per order. Sellers drained approx. 900cc of clear, yellow urine. Bed in lowest position, side rails up.

## 2020-08-02 NOTE — CONSULT NOTE ADULT - ATTENDING COMMENTS
Patient seen and examined in ED. Patient with history of HTN and BPH. He is unsure of his medications but recently started having urinary trouble and was given a prescription for Bactrim. Subsequent to this he developed significant nausea and discomfort. He now presents to ED with feeling unwell and dizziness and is found to have hyponatremia. He did not have any reported seizures and he is mentating well.    1. Hyponatremia - in the setting of suspected SIADH. Continue BMP Q4. Would suggest fluid restriction at this time. Consider nephrology evaluation.  2. Mental status - intact. Patient is coherent and without any acute changes. I spoke with him via [employer]  [id number] today.  3. Pulmonary status - stable on room air. No respiratory distress.    Patient does not require ICU level care at this time. Please reconsult as needed
Hyponatremia: with s/o headaches, dizziness consistent with symptomatic hyponatremia    His urine lytes and urine osmolality are suggestive of SAIDH although given an increase in serum sodium with just NS goes against it  Now s/p sutton placement with significant u/o ( appears very clear)  Would check a stat BMP for a repeat sodium. Its possible that given the increased urine output his sodium will continue to increase  If repeat sodium is stable to lower, will start 2% saline  Given his high urine sodium/Urine K he is less likely to respond to just fluid restriction alone  He will need bmp to be checked every 3 hours      Yeni Arriola MD  O: [phone]  C: [phone]

## 2020-08-02 NOTE — H&P ADULT - PROBLEM SELECTOR PLAN 5
Dispo:   1.  Name of PCP:   2.  PCP Contacted on Admission: [ ] Y    [ ] N    3.  PCP contacted at Discharge: [ ] Y    [ ] N    [ ] N/A  4.  Post-Discharge Appointment Date and Location:  5.  Summary of Handoff given to PCP: Dispo:   1.  Name of PCP: Leonila Acuña  2.  PCP Contacted on Admission: [ ] Y    [x] N    3.  PCP contacted at Discharge: [ ] Y    [ ] N    [ ] N/A  4.  Post-Discharge Appointment Date and Location:  5.  Summary of Handoff given to PCP:

## 2020-08-02 NOTE — ED PROVIDER NOTE - OBJECTIVE STATEMENT
83 y/o Greenlandic speaking M hx of HTN c/o constant lightheaded/dizziness without vertigo or syncope for 2 weeks. A/w generalized constant headache and nausea/NBNB vomiting. Denies f/c, CP, SOB, abd pain, diarrhea. Symptoms impacting ability to eat and sleep, was rx ambien by PCP 2 days ago with no relief.

## 2020-08-03 DIAGNOSIS — N17.9 ACUTE KIDNEY FAILURE, UNSPECIFIED: ICD-10-CM

## 2020-08-03 PROBLEM — Z00.00 ENCOUNTER FOR PREVENTIVE HEALTH EXAMINATION: Status: ACTIVE | Noted: 2020-08-03

## 2020-08-03 LAB
ANION GAP SERPL CALC-SCNC: 10 MMOL/L — SIGNIFICANT CHANGE UP (ref 5–17)
ANION GAP SERPL CALC-SCNC: 13 MMOL/L — SIGNIFICANT CHANGE UP (ref 5–17)
ANION GAP SERPL CALC-SCNC: 16 MMOL/L — SIGNIFICANT CHANGE UP (ref 5–17)
ANION GAP SERPL CALC-SCNC: 17 MMOL/L — SIGNIFICANT CHANGE UP (ref 5–17)
BUN SERPL-MCNC: 20 MG/DL — SIGNIFICANT CHANGE UP (ref 7–23)
BUN SERPL-MCNC: 23 MG/DL — SIGNIFICANT CHANGE UP (ref 7–23)
BUN SERPL-MCNC: 24 MG/DL — HIGH (ref 7–23)
BUN SERPL-MCNC: 27 MG/DL — HIGH (ref 7–23)
CALCIUM SERPL-MCNC: 8 MG/DL — LOW (ref 8.4–10.5)
CALCIUM SERPL-MCNC: 8.5 MG/DL — SIGNIFICANT CHANGE UP (ref 8.4–10.5)
CALCIUM SERPL-MCNC: 8.6 MG/DL — SIGNIFICANT CHANGE UP (ref 8.4–10.5)
CALCIUM SERPL-MCNC: 8.9 MG/DL — SIGNIFICANT CHANGE UP (ref 8.4–10.5)
CHLORIDE SERPL-SCNC: 82 MMOL/L — LOW (ref 96–108)
CHLORIDE SERPL-SCNC: 83 MMOL/L — LOW (ref 96–108)
CHLORIDE SERPL-SCNC: 84 MMOL/L — LOW (ref 96–108)
CHLORIDE SERPL-SCNC: 84 MMOL/L — LOW (ref 96–108)
CHLORIDE SERPL-SCNC: 88 MMOL/L — LOW (ref 96–108)
CHLORIDE SERPL-SCNC: 90 MMOL/L — LOW (ref 96–108)
CO2 SERPL-SCNC: 16 MMOL/L — LOW (ref 22–31)
CO2 SERPL-SCNC: 17 MMOL/L — LOW (ref 22–31)
CO2 SERPL-SCNC: 18 MMOL/L — LOW (ref 22–31)
CO2 SERPL-SCNC: 19 MMOL/L — LOW (ref 22–31)
CREAT SERPL-MCNC: 1.24 MG/DL — SIGNIFICANT CHANGE UP (ref 0.5–1.3)
CREAT SERPL-MCNC: 1.25 MG/DL — SIGNIFICANT CHANGE UP (ref 0.5–1.3)
CREAT SERPL-MCNC: 1.38 MG/DL — HIGH (ref 0.5–1.3)
CREAT SERPL-MCNC: 1.46 MG/DL — HIGH (ref 0.5–1.3)
CREAT SERPL-MCNC: 1.51 MG/DL — HIGH (ref 0.5–1.3)
CREAT SERPL-MCNC: 1.52 MG/DL — HIGH (ref 0.5–1.3)
CULTURE RESULTS: NO GROWTH — SIGNIFICANT CHANGE UP
GLUCOSE SERPL-MCNC: 104 MG/DL — HIGH (ref 70–99)
GLUCOSE SERPL-MCNC: 110 MG/DL — HIGH (ref 70–99)
GLUCOSE SERPL-MCNC: 114 MG/DL — HIGH (ref 70–99)
GLUCOSE SERPL-MCNC: 143 MG/DL — HIGH (ref 70–99)
GLUCOSE SERPL-MCNC: 202 MG/DL — HIGH (ref 70–99)
GLUCOSE SERPL-MCNC: 86 MG/DL — SIGNIFICANT CHANGE UP (ref 70–99)
HCT VFR BLD CALC: 34.3 % — LOW (ref 39–50)
HGB BLD-MCNC: 12.4 G/DL — LOW (ref 13–17)
MAGNESIUM SERPL-MCNC: 1.8 MG/DL — SIGNIFICANT CHANGE UP (ref 1.6–2.6)
MCHC RBC-ENTMCNC: 30.8 PG — SIGNIFICANT CHANGE UP (ref 27–34)
MCHC RBC-ENTMCNC: 36.2 GM/DL — HIGH (ref 32–36)
MCV RBC AUTO: 85.3 FL — SIGNIFICANT CHANGE UP (ref 80–100)
NRBC # BLD: 0 /100 WBCS — SIGNIFICANT CHANGE UP (ref 0–0)
OSMOLALITY UR: 593 MOS/KG — SIGNIFICANT CHANGE UP (ref 300–900)
OSMOLALITY UR: 595 MOS/KG — SIGNIFICANT CHANGE UP (ref 300–900)
PHOSPHATE SERPL-MCNC: 2.1 MG/DL — LOW (ref 2.5–4.5)
PLATELET # BLD AUTO: 277 K/UL — SIGNIFICANT CHANGE UP (ref 150–400)
POTASSIUM SERPL-MCNC: 3.7 MMOL/L — SIGNIFICANT CHANGE UP (ref 3.5–5.3)
POTASSIUM SERPL-MCNC: 3.7 MMOL/L — SIGNIFICANT CHANGE UP (ref 3.5–5.3)
POTASSIUM SERPL-MCNC: 4.3 MMOL/L — SIGNIFICANT CHANGE UP (ref 3.5–5.3)
POTASSIUM SERPL-MCNC: 4.5 MMOL/L — SIGNIFICANT CHANGE UP (ref 3.5–5.3)
POTASSIUM SERPL-MCNC: 4.5 MMOL/L — SIGNIFICANT CHANGE UP (ref 3.5–5.3)
POTASSIUM SERPL-MCNC: 4.6 MMOL/L — SIGNIFICANT CHANGE UP (ref 3.5–5.3)
POTASSIUM SERPL-SCNC: 3.7 MMOL/L — SIGNIFICANT CHANGE UP (ref 3.5–5.3)
POTASSIUM SERPL-SCNC: 3.7 MMOL/L — SIGNIFICANT CHANGE UP (ref 3.5–5.3)
POTASSIUM SERPL-SCNC: 4.3 MMOL/L — SIGNIFICANT CHANGE UP (ref 3.5–5.3)
POTASSIUM SERPL-SCNC: 4.5 MMOL/L — SIGNIFICANT CHANGE UP (ref 3.5–5.3)
POTASSIUM SERPL-SCNC: 4.5 MMOL/L — SIGNIFICANT CHANGE UP (ref 3.5–5.3)
POTASSIUM SERPL-SCNC: 4.6 MMOL/L — SIGNIFICANT CHANGE UP (ref 3.5–5.3)
RBC # BLD: 4.02 M/UL — LOW (ref 4.2–5.8)
RBC # FLD: 11.4 % — SIGNIFICANT CHANGE UP (ref 10.3–14.5)
SARS-COV-2 IGG SERPL QL IA: NEGATIVE — SIGNIFICANT CHANGE UP
SARS-COV-2 IGM SERPL IA-ACNC: <3.8 AU/ML — SIGNIFICANT CHANGE UP
SODIUM SERPL-SCNC: 116 MMOL/L — CRITICAL LOW (ref 135–145)
SODIUM SERPL-SCNC: 117 MMOL/L — CRITICAL LOW (ref 135–145)
SODIUM SERPL-SCNC: 118 MMOL/L — CRITICAL LOW (ref 135–145)
SODIUM SERPL-SCNC: 119 MMOL/L — CRITICAL LOW (ref 135–145)
SPECIMEN SOURCE: SIGNIFICANT CHANGE UP
WBC # BLD: 9.01 K/UL — SIGNIFICANT CHANGE UP (ref 3.8–10.5)
WBC # FLD AUTO: 9.01 K/UL — SIGNIFICANT CHANGE UP (ref 3.8–10.5)

## 2020-08-03 PROCEDURE — 99233 SBSQ HOSP IP/OBS HIGH 50: CPT | Mod: GC

## 2020-08-03 PROCEDURE — 99232 SBSQ HOSP IP/OBS MODERATE 35: CPT | Mod: GC

## 2020-08-03 RX ORDER — SODIUM CHLORIDE 5 G/100ML
500 INJECTION, SOLUTION INTRAVENOUS
Refills: 0 | Status: DISCONTINUED | OUTPATIENT
Start: 2020-08-03 | End: 2020-08-04

## 2020-08-03 RX ORDER — SODIUM CHLORIDE 5 G/100ML
500 INJECTION, SOLUTION INTRAVENOUS
Refills: 0 | Status: DISCONTINUED | OUTPATIENT
Start: 2020-08-03 | End: 2020-08-03

## 2020-08-03 RX ADMIN — FINASTERIDE 5 MILLIGRAM(S): 5 TABLET, FILM COATED ORAL at 11:15

## 2020-08-03 RX ADMIN — TAMSULOSIN HYDROCHLORIDE 0.4 MILLIGRAM(S): 0.4 CAPSULE ORAL at 22:22

## 2020-08-03 RX ADMIN — SODIUM CHLORIDE 30 MILLILITER(S): 5 INJECTION, SOLUTION INTRAVENOUS at 20:25

## 2020-08-03 RX ADMIN — SODIUM CHLORIDE 30 MILLILITER(S): 5 INJECTION, SOLUTION INTRAVENOUS at 12:22

## 2020-08-03 RX ADMIN — ENOXAPARIN SODIUM 40 MILLIGRAM(S): 100 INJECTION SUBCUTANEOUS at 11:15

## 2020-08-03 NOTE — PHYSICAL THERAPY INITIAL EVALUATION ADULT - GENERAL OBSERVATIONS, REHAB EVAL
Received semisupine in bed, A&Ox4, Turkmen speaking,  services used, Tyree Carrero ID# [id number], +IVL, +sutton.

## 2020-08-03 NOTE — PROGRESS NOTE ADULT - ATTENDING COMMENTS
84 M, Greenlandic-speaking with h/o HTN, BPH presenting with dizziness, nausea and insomnia for 10 days, recently prescribed Bactrim by PMD,  Found to have Na 113, mild HA and dizziness. Has been on ARB/diuretic, no N/V/D.    Seen, examined the patient this am with house staff  Sitting in chair, c/o mild dizziness and HA, no N/V/D, hemodynamically stable  - Reviewed labs, imaging    Repeat Na this am 118, on admission serum osmolality 244, Uosm 529, Gina 46-> SIADH  ** Hyponatremia-     Result of multifactorial- SIADH, urinary retention, meds-diuretic/bactrim    Appreciated Renal plan, closely f/u Na on hypertonic saline as instructed by Renal    Hold Diuretic/ARB/Bactrim  ** Urinary retention-     Retained 700ml, on Sellers, Scr     c/w Flomax, Proscar    outpatient f/u with his urology  ** BP- stable     On amlodopine

## 2020-08-03 NOTE — PROVIDER CONTACT NOTE (CRITICAL VALUE NOTIFICATION) - PERSON GIVING RESULT:
Andrei Mathis/Core lab; Na118
Chemistry Lab/ Luisa Bar
Irena Bridges, Core lab
Kriss Garg, Core lab
Leida Diaz
Paul Gramajo, Core lab
Rk/ Guanako Holland
Nettie Diaz

## 2020-08-03 NOTE — PROGRESS NOTE ADULT - PROBLEM SELECTOR PLAN 3
Recently prescribed Bactrim by PMD for urinary retention. Found to be retaining in ED w/ bladder scan 700'sml, sutton placed.  - cont sutton   - on home tamsulosin and finasteride

## 2020-08-03 NOTE — PROGRESS NOTE ADULT - PROBLEM SELECTOR PLAN 1
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  In ED, patient was given IVF NS with sNa uptrend to 115 mEq.  On admission sNa 113 (8/2 5am)-->120 (8/2 ~8pm)-->118 (8/3 ~7am)-->117 (~10am)      Recommend  Please give hypertonic saline 2% 30 cc/hr for 4 hrs only then repeat BMP and urine osmolality.  Continue fluid restriction, avoid bactrim or hypotonic fluid including additive to IV medications  GOAL serum Na today 5pm is 121-122 and tomorrow 8/4 5am 125-127  continue monitor BMP q4hr, avoid rapid correction >6-8 mEq/24hr or >12-18 mEq/48hrs to avoid risk Osmotic Demyelination Syndrome (ODS)

## 2020-08-03 NOTE — PROGRESS NOTE ADULT - PROBLEM SELECTOR PLAN 2
PALMA likely 2/2 post obstructive diuresis   On admission sCr 1.26, increased to 1.70 (post sutton significant UOP) then improved 1.38 on 8/3.  Urinalysis trace protein with rbc.  Last sCr 1.5 likely 2/2 dehydration post obstructive diuresis >3.5L UOP while fluid restricted    Recommend monitor BMP for now, if worsening will need consider fluid replacement to match 1 cc urine to 0.75 cc IVF.  Avoid nephrotoxic agents (NSAIDs, PPI, contrast)  strict I/O

## 2020-08-03 NOTE — PROGRESS NOTE ADULT - SUBJECTIVE AND OBJECTIVE BOX
[facility] DIVISION OF KIDNEY DISEASES AND HYPERTENSION   -- FOLLOW UP NOTE --   Efe Lau  Nephrology Fellow  Pager NS: [phone]   /  Pager LIROME: [phone]  (after 5pm or weekend please page the on-call fellow)  --------------------------------------------------------------------------------  24 hour events/subjective:  - overnight pt given D5w for rise sNa 120 w/ repeat 116, vitals afebrile, hypotensive episode lowest BP 91/50, total UOP 3.9L in the past 24hr  - patient seen and examined at bedside this morning who endorse dizziness, headache and nausea  - vitals/lab/medications reviewed, noted for serum Na 113 to 118 in past 24hr, last sNa 117 started on 2% hypertonic saline      PAST HISTORY  --------------------------------------------------------------------------------  No significant changes to PMH, PSH, FHx, SHx, unless otherwise noted    ALLERGIES & MEDICATIONS  --------------------------------------------------------------------------------  Allergies    No Known Allergies    Intolerances  Standing Inpatient Medications  amLODIPine   Tablet 5 milliGRAM(s) Oral daily  enoxaparin Injectable 40 milliGRAM(s) SubCutaneous daily  finasteride 5 milliGRAM(s) Oral daily  polyethylene glycol 3350 17 Gram(s) Oral daily  sodium chloride 2% . 500 milliLiter(s) IV Continuous <Continuous>  tamsulosin 0.4 milliGRAM(s) Oral at bedtime    PRN Inpatient Medications  senna 2 Tablet(s) Oral at bedtime PRN    REVIEW OF SYSTEMS  --------------------------------------------------------------------------------  Gen: no fever, chills, weakness  Respiratory: No dyspnea, cough  CV: No chest pain, orthopnea  GI: + nausea.  No abdominal pain, vomiting, diarrhea  MSK: no edema  Neuro: + dizziness, lightheadedness  Heme: No bleeding  All other systems were reviewed and are negative, except as noted.    VITALS/PHYSICAL EXAM  --------------------------------------------------------------------------------  T(C): 36.9 (08-03-20 @ 05:01), Max: 37.1 (08-02-20 @ 14:11)  HR: 77 (08-03-20 @ 10:15) (74 - 77)  BP: 145/73 (08-03-20 @ 10:15) (99/50 - 145/73)  RR: 18 (08-03-20 @ 05:01) (18 - 20)  SpO2: 97% (08-03-20 @ 10:15) (97% - 98%)  Wt(kg): --  Height (cm): 168 (08-02-20 @ 14:11)  Weight (kg): 65.7 (08-02-20 @ 14:11)  BMI (kg/m2): 23.3 (08-02-20 @ 14:11)  BSA (m2): 1.75 (08-02-20 @ 14:11)    08-02-20 @ 07:01  -  08-03-20 @ 07:00  --------------------------------------------------------  IN: 1520 mL / OUT: 3950 mL / NET: -2430 mL    Physical Exam:              Gen: NAD, well-appearing on room air  	HEENT: moist mucous membrane  	Pulm: CTA B/L, no crackles  	CV: RRR, S1S2; no rub/murmur  	GI: +BS, soft, nontender/nondistended  	: sutton catheter in place w/ clear urine  	MSK: Warm, no edema, no asterixis              Neuro: AAOx3  	Psych: Normal affect and mood  	Skin: Warm    LABS/STUDIES  --------------------------------------------------------------------------------              12.4   9.01  >-----------<  277      [08-03-20 @ 07:09]              34.3     117  |  82  |  20  ----------------------------<  114      [08-03-20 @ 10:23]  4.3   |  19  |  1.51        Ca     8.9     [08-03-20 @ 10:23]      Mg     1.8     [08-03-20 @ 07:08]      Phos  2.1     [08-03-20 @ 07:08]    TPro  6.7  /  Alb  4.0  /  TBili  0.6  /  DBili  x   /  AST  23  /  ALT  14  /  AlkPhos  59  [08-02-20 @ 04:57]    Serum Osmolality 244      [08-02-20 @ 10:07]    Creatinine Trend:  SCr 1.51 [08-03 @ 10:23]  SCr 1.38 [08-03 @ 07:08]  SCr 1.52 [08-03 @ 01:15]  SCr 1.70 [08-02 @ 20:46]  SCr 1.33 [08-02 @ 15:55]    Urinalysis - [08-02-20 @ 09:49]      Color Light Yellow / Appearance Clear / SG 1.019 / pH 7.0      Gluc Negative / Ketone Negative  / Bili Negative / Urobili Negative       Blood Negative / Protein Trace / Leuk Est Negative / Nitrite Negative      RBC 5 / WBC 0 / Hyaline 0 / Gran  / Sq Epi  / Non Sq Epi 0 / Bacteria Negative    Urine Sodium 128      [08-02-20 @ 09:49]  Urine Potassium 46      [08-02-20 @ 09:49]  Urine Osmolality 595      [08-03-20 @ 07:13]

## 2020-08-03 NOTE — PHYSICAL THERAPY INITIAL EVALUATION ADULT - PLANNED THERAPY INTERVENTIONS, PT EVAL
gait training/stair training - GOAL: Pt will negotiate one flight of stairs independently in 2 weeks.

## 2020-08-03 NOTE — PROVIDER CONTACT NOTE (CRITICAL VALUE NOTIFICATION) - ACTION/TREATMENT ORDERED:
D5W at 200 cc for 4 hours, continue to monitor UO
MD notified. No further recs at this time. Redraw BMP at 1000. Will continue to monitor patient.
MD notified. No further recs at this time. Will continue to monitor patient.
Fluid restriction 1000/day and BMP [id number].
Target Na 116-118, no action needs at this time. Next BMP due at 6AM
Will review with nephrologist

## 2020-08-03 NOTE — PROVIDER CONTACT NOTE (CRITICAL VALUE NOTIFICATION) - SITUATION
Na 120
BMP: Na 116
BMP: Na 119
Pt's 1400 BMP, Na is 118
Pt's Na is 117
Pt's Na is 118
Pt's Na is 118
VF=[id number]

## 2020-08-03 NOTE — PROGRESS NOTE ADULT - ASSESSMENT
83 yo Icelandic-speaking M PMH of HTN, BPH presenting with dizziness, nausea and insomnia for 10 days, recently prescribed Bactrim by PMD for urinary retention. Found to have Na 113, admitted for hyponatremia.

## 2020-08-03 NOTE — PHYSICAL THERAPY INITIAL EVALUATION ADULT - ADDITIONAL COMMENTS
Pt lives alone in an apartment with elevator access and 11 steps to enter +HR. Pt has +tub shower. Pt was independent with all mobility and ADLs prior to hospitalization. Pt reports being very active and going to the gym regularly prior to covid pandemic.

## 2020-08-03 NOTE — PROGRESS NOTE ADULT - PROBLEM SELECTOR PLAN 1
Na 113 s/p 1L NS in ED, now 115. Likely mulfactorial 2/2 diuretics vs. SIADH given high urine sodium, high urine osmol and low serum osmol, possibly in setting of recent rx Bactrim.  - fluid restrict 1L. Goal correction rate 6-8 mEq/L over 24 hrs.   - Monitor BMP q6  - renal consulted, appreciate recs Na 113 at admit. Likely multifactorial 2/2 diuretics vs. SIADH given high urine sodium, high urine osmol and low serum osmol, possibly in setting of recent rx Bactrim. Now slowly improving  - fluid restrict 1L. Goal correction rate 6-8 mEq/L over 24 hrs.   - Monitor BMP q4  - s/p 1 dose DDVAP  - nephro consulted, appreciate recs

## 2020-08-03 NOTE — PROGRESS NOTE ADULT - SUBJECTIVE AND OBJECTIVE BOX
PROGRESS NOTE:   Authored by Dr. Claire Willoughby MD, Pager [phone] [facility], [id number] [facility]     Patient is a 84y old  Male who presents with a chief complaint of hyponatremia (02 Aug 2020 14:37)      SUBJECTIVE / OVERNIGHT EVENTS: Na120 at 10pm , started D5W for 4h.     ADDITIONAL REVIEW OF SYSTEMS: Denies fevers, chills, n/v.    MEDICATIONS  (STANDING):  amLODIPine   Tablet 5 milliGRAM(s) Oral daily  dextrose 5%. 1000 milliLiter(s) (200 mL/Hr) IV Continuous <Continuous>  enoxaparin Injectable 40 milliGRAM(s) SubCutaneous daily  finasteride 5 milliGRAM(s) Oral daily  polyethylene glycol 3350 17 Gram(s) Oral daily  tamsulosin 0.4 milliGRAM(s) Oral at bedtime    MEDICATIONS  (PRN):  senna 2 Tablet(s) Oral at bedtime PRN if constipated      CAPILLARY BLOOD GLUCOSE        I&O's Summary    02 Aug 2020 07:01  -  03 Aug 2020 06:56  --------------------------------------------------------  IN: 1520 mL / OUT: 3950 mL / NET: -2430 mL        PHYSICAL EXAM:  Vital Signs Last 24 Hrs  T(C): 36.9 (03 Aug 2020 05:01), Max: 37.1 (02 Aug 2020 14:11)  T(F): 98.4 (03 Aug 2020 05:01), Max: 98.8 (02 Aug 2020 14:11)  HR: 74 (03 Aug 2020 05:01) (71 - 82)  BP: 99/50 (03 Aug 2020 05:01) (99/50 - 158/68)  BP(mean): --  RR: 18 (03 Aug 2020 05:01) (16 - 20)  SpO2: 97% (03 Aug 2020 05:01) (97% - 100%)    CONSTITUTIONAL: NAD, lying in bed comfortably  RESPIRATORY: Normal respiratory effort; CTABL  CARDIOVASCULAR: Regular rate and rhythm, normal S1 and S2, no murmur/rub/gallop  ABDOMEN: Soft, nondistended, nontender to palpation, normoactive bowel sounds, no rebound/guarding  MUSCLOSKELETAL: no joint swelling or tenderness to palpation, FROM all extremities  NEURO: AAOx3 to person, place, and time, full and equal strength all extremities   EXTREMITIES: no pedal edema    LABS:                        13.2   8.12  )-----------( 307      ( 02 Aug 2020 04:57 )             35.9     08-03    116<LL>  |  84<L>  |  20  ----------------------------<  202<H>  3.7   |  16<L>  |  1.52<H>    Ca    8.0<L>      03 Aug 2020 01:15  Phos  2.2     08-02  Mg     1.6     08-02    TPro  6.7  /  Alb  4.0  /  TBili  0.6  /  DBili  x   /  AST  23  /  ALT  14  /  AlkPhos  59  08-02          Urinalysis Basic - ( 02 Aug 2020 09:49 )    Color: Light Yellow / Appearance: Clear / S.019 / pH: x  Gluc: x / Ketone: Negative  / Bili: Negative / Urobili: Negative   Blood: x / Protein: Trace / Nitrite: Negative   Leuk Esterase: Negative / RBC: 5 /hpf / WBC 0 /HPF   Sq Epi: x / Non Sq Epi: 0 /hpf / Bacteria: Negative          RADIOLOGY & ADDITIONAL TESTS: PROGRESS NOTE:   Authored by Dr. Claire Willoughby MD, Pager [phone] [facility], [address] [facility]     Patient is a 84y old  Male who presents with a chief complaint of hyponatremia (02 Aug 2020 14:37)      SUBJECTIVE / OVERNIGHT EVENTS: Na120 at 10pm , started D5W for 4h. Pt seen this AM, reports dizziness is improved. No nausea.     ADDITIONAL REVIEW OF SYSTEMS: Denies pain, fevers.     MEDICATIONS  (STANDING):  amLODIPine   Tablet 5 milliGRAM(s) Oral daily  dextrose 5%. 1000 milliLiter(s) (200 mL/Hr) IV Continuous <Continuous>  enoxaparin Injectable 40 milliGRAM(s) SubCutaneous daily  finasteride 5 milliGRAM(s) Oral daily  polyethylene glycol 3350 17 Gram(s) Oral daily  tamsulosin 0.4 milliGRAM(s) Oral at bedtime    MEDICATIONS  (PRN):  senna 2 Tablet(s) Oral at bedtime PRN if constipated      CAPILLARY BLOOD GLUCOSE        I&O's Summary    02 Aug 2020 07:01  -  03 Aug 2020 06:56  --------------------------------------------------------  IN: 1520 mL / OUT: 3950 mL / NET: -2430 mL        PHYSICAL EXAM:  Vital Signs Last 24 Hrs  T(C): 36.9 (03 Aug 2020 05:01), Max: 37.1 (02 Aug 2020 14:11)  T(F): 98.4 (03 Aug 2020 05:01), Max: 98.8 (02 Aug 2020 14:11)  HR: 74 (03 Aug 2020 05:01) (71 - 82)  BP: 99/50 (03 Aug 2020 05:01) (99/50 - 158/68)  BP(mean): --  RR: 18 (03 Aug 2020 05:01) (16 - 20)  SpO2: 97% (03 Aug 2020 05:01) (97% - 100%)    CONSTITUTIONAL: NAD, lying in bed comfortably  RESPIRATORY: Normal respiratory effort; CTABL  CARDIOVASCULAR: Regular rate and rhythm, normal S1 and S2, no murmur/rub/gallop  ABDOMEN: Soft, nondistended, nontender to palpation, normoactive bowel sounds, no rebound/guarding. Sellers in place.  MUSCLOSKELETAL: no joint swelling or tenderness to palpation, FROM all extremities  NEURO: AAOx3 to person, place, and time, full and equal strength all extremities   EXTREMITIES: no pedal edema    LABS:                        13.2   8.12  )-----------( 307      ( 02 Aug 2020 04:57 )             35.9     08-03    116<LL>  |  84<L>  |  20  ----------------------------<  202<H>  3.7   |  16<L>  |  1.52<H>    Ca    8.0<L>      03 Aug 2020 01:15  Phos  2.2     08-02  Mg     1.6     08-02    TPro  6.7  /  Alb  4.0  /  TBili  0.6  /  DBili  x   /  AST  23  /  ALT  14  /  AlkPhos  59  08-02          Urinalysis Basic - ( 02 Aug 2020 09:49 )    Color: Light Yellow / Appearance: Clear / S.019 / pH: x  Gluc: x / Ketone: Negative  / Bili: Negative / Urobili: Negative   Blood: x / Protein: Trace / Nitrite: Negative   Leuk Esterase: Negative / RBC: 5 /hpf / WBC 0 /HPF   Sq Epi: x / Non Sq Epi: 0 /hpf / Bacteria: Negative PROGRESS NOTE:   Authored by Dr. Claire Willoughby MD, Pager [phone] [facility], [id number] [facility]     Patient is a 84y old  Male who presents with a chief complaint of hyponatremia (02 Aug 2020 14:37)      SUBJECTIVE / OVERNIGHT EVENTS: Na120 at 10pm , started D5W for 4h. Pt seen this AM, reports headache and dizziness improved yesterday but present again today.  No nausea.     ADDITIONAL REVIEW OF SYSTEMS: Denies pain, fevers.     MEDICATIONS  (STANDING):  amLODIPine   Tablet 5 milliGRAM(s) Oral daily  dextrose 5%. 1000 milliLiter(s) (200 mL/Hr) IV Continuous <Continuous>  enoxaparin Injectable 40 milliGRAM(s) SubCutaneous daily  finasteride 5 milliGRAM(s) Oral daily  polyethylene glycol 3350 17 Gram(s) Oral daily  tamsulosin 0.4 milliGRAM(s) Oral at bedtime    MEDICATIONS  (PRN):  senna 2 Tablet(s) Oral at bedtime PRN if constipated      CAPILLARY BLOOD GLUCOSE        I&O's Summary    02 Aug 2020 07:01  -  03 Aug 2020 06:56  --------------------------------------------------------  IN: 1520 mL / OUT: 3950 mL / NET: -2430 mL        PHYSICAL EXAM:  Vital Signs Last 24 Hrs  T(C): 36.9 (03 Aug 2020 05:01), Max: 37.1 (02 Aug 2020 14:11)  T(F): 98.4 (03 Aug 2020 05:01), Max: 98.8 (02 Aug 2020 14:11)  HR: 74 (03 Aug 2020 05:01) (71 - 82)  BP: 99/50 (03 Aug 2020 05:01) (99/50 - 158/68)  BP(mean): --  RR: 18 (03 Aug 2020 05:01) (16 - 20)  SpO2: 97% (03 Aug 2020 05:01) (97% - 100%)    CONSTITUTIONAL: NAD, lying in bed comfortably  RESPIRATORY: Normal respiratory effort; CTABL  CARDIOVASCULAR: Regular rate and rhythm, normal S1 and S2, no murmur/rub/gallop  ABDOMEN: Soft, nondistended, nontender to palpation, normoactive bowel sounds, no rebound/guarding. Sellers in place.  MUSCLOSKELETAL: no joint swelling or tenderness to palpation, FROM all extremities  NEURO: AAOx3 to person, place, and time, full and equal strength all extremities   EXTREMITIES: no pedal edema    LABS:                        13.2   8.12  )-----------( 307      ( 02 Aug 2020 04:57 )             35.9     08-03    116<LL>  |  84<L>  |  20  ----------------------------<  202<H>  3.7   |  16<L>  |  1.52<H>    Ca    8.0<L>      03 Aug 2020 01:15  Phos  2.2     08-02  Mg     1.6     08-02    TPro  6.7  /  Alb  4.0  /  TBili  0.6  /  DBili  x   /  AST  23  /  ALT  14  /  AlkPhos  59  08-02          Urinalysis Basic - ( 02 Aug 2020 09:49 )    Color: Light Yellow / Appearance: Clear / S.019 / pH: x  Gluc: x / Ketone: Negative  / Bili: Negative / Urobili: Negative   Blood: x / Protein: Trace / Nitrite: Negative   Leuk Esterase: Negative / RBC: 5 /hpf / WBC 0 /HPF   Sq Epi: x / Non Sq Epi: 0 /hpf / Bacteria: Negative

## 2020-08-03 NOTE — PROGRESS NOTE ADULT - ATTENDING COMMENTS
Hyponatremia: with s/o headaches, dizziness consistent with symptomatic hyponatremia  Multifactorial hyponatremia: urinary retention, bactrim use causing nausea and increased ADH state  He is still feeling dizzy this AM  Na overall improved, yesterday had been given D5 and DDAVP to avoid rapid correction  Would trend daily uosm; bc urine osm above 500s would start 2%NACL for 4 hours and repeat Na level Hyponatremia: with s/o headaches, dizziness consistent with symptomatic hyponatremia  Multifactorial hyponatremia: urinary retention, bactrim use causing nausea and increased ADH state  He is still feeling dizzy this AM  Na overall improved, yesterday had been given D5 and DDAVP to avoid rapid correction  Would trend daily uosm; bc urine osm above 500s would start 2%NACL for 4 hours and repeat Na level    Kristen Vallejo MD  Off: [phone]  Cell: [phone]

## 2020-08-03 NOTE — PROGRESS NOTE ADULT - PROBLEM SELECTOR PLAN 5
Dispo:   1.  Name of PCP: Leonila Acuña  2.  PCP Contacted on Admission: [ ] Y    [x] N    3.  PCP contacted at Discharge: [ ] Y    [ ] N    [ ] N/A  4.  Post-Discharge Appointment Date and Location:  5.  Summary of Handoff given to PCP:

## 2020-08-04 LAB
ANION GAP SERPL CALC-SCNC: 12 MMOL/L — SIGNIFICANT CHANGE UP (ref 5–17)
ANION GAP SERPL CALC-SCNC: 14 MMOL/L — SIGNIFICANT CHANGE UP (ref 5–17)
ANION GAP SERPL CALC-SCNC: 15 MMOL/L — SIGNIFICANT CHANGE UP (ref 5–17)
ANION GAP SERPL CALC-SCNC: 9 MMOL/L — SIGNIFICANT CHANGE UP (ref 5–17)
BUN SERPL-MCNC: 24 MG/DL — HIGH (ref 7–23)
BUN SERPL-MCNC: 25 MG/DL — HIGH (ref 7–23)
BUN SERPL-MCNC: 26 MG/DL — HIGH (ref 7–23)
BUN SERPL-MCNC: 28 MG/DL — HIGH (ref 7–23)
CALCIUM SERPL-MCNC: 8.4 MG/DL — SIGNIFICANT CHANGE UP (ref 8.4–10.5)
CALCIUM SERPL-MCNC: 8.5 MG/DL — SIGNIFICANT CHANGE UP (ref 8.4–10.5)
CALCIUM SERPL-MCNC: 8.6 MG/DL — SIGNIFICANT CHANGE UP (ref 8.4–10.5)
CALCIUM SERPL-MCNC: 8.6 MG/DL — SIGNIFICANT CHANGE UP (ref 8.4–10.5)
CHLORIDE SERPL-SCNC: 90 MMOL/L — LOW (ref 96–108)
CHLORIDE SERPL-SCNC: 91 MMOL/L — LOW (ref 96–108)
CHLORIDE SERPL-SCNC: 92 MMOL/L — LOW (ref 96–108)
CHLORIDE SERPL-SCNC: 92 MMOL/L — LOW (ref 96–108)
CO2 SERPL-SCNC: 17 MMOL/L — LOW (ref 22–31)
CO2 SERPL-SCNC: 17 MMOL/L — LOW (ref 22–31)
CO2 SERPL-SCNC: 19 MMOL/L — LOW (ref 22–31)
CO2 SERPL-SCNC: 20 MMOL/L — LOW (ref 22–31)
CREAT SERPL-MCNC: 1.11 MG/DL — SIGNIFICANT CHANGE UP (ref 0.5–1.3)
CREAT SERPL-MCNC: 1.15 MG/DL — SIGNIFICANT CHANGE UP (ref 0.5–1.3)
CREAT SERPL-MCNC: 1.18 MG/DL — SIGNIFICANT CHANGE UP (ref 0.5–1.3)
CREAT SERPL-MCNC: 1.24 MG/DL — SIGNIFICANT CHANGE UP (ref 0.5–1.3)
GLUCOSE SERPL-MCNC: 115 MG/DL — HIGH (ref 70–99)
GLUCOSE SERPL-MCNC: 127 MG/DL — HIGH (ref 70–99)
GLUCOSE SERPL-MCNC: 167 MG/DL — HIGH (ref 70–99)
GLUCOSE SERPL-MCNC: 99 MG/DL — SIGNIFICANT CHANGE UP (ref 70–99)
HCT VFR BLD CALC: 34.8 % — LOW (ref 39–50)
HGB BLD-MCNC: 12.5 G/DL — LOW (ref 13–17)
MAGNESIUM SERPL-MCNC: 1.9 MG/DL — SIGNIFICANT CHANGE UP (ref 1.6–2.6)
MCHC RBC-ENTMCNC: 31 PG — SIGNIFICANT CHANGE UP (ref 27–34)
MCHC RBC-ENTMCNC: 35.9 GM/DL — SIGNIFICANT CHANGE UP (ref 32–36)
MCV RBC AUTO: 86.4 FL — SIGNIFICANT CHANGE UP (ref 80–100)
NRBC # BLD: 0 /100 WBCS — SIGNIFICANT CHANGE UP (ref 0–0)
OSMOLALITY UR: 377 MOS/KG — SIGNIFICANT CHANGE UP (ref 300–900)
PHOSPHATE SERPL-MCNC: 2 MG/DL — LOW (ref 2.5–4.5)
PLATELET # BLD AUTO: 270 K/UL — SIGNIFICANT CHANGE UP (ref 150–400)
POTASSIUM SERPL-MCNC: 4.1 MMOL/L — SIGNIFICANT CHANGE UP (ref 3.5–5.3)
POTASSIUM SERPL-MCNC: 4.4 MMOL/L — SIGNIFICANT CHANGE UP (ref 3.5–5.3)
POTASSIUM SERPL-MCNC: 4.5 MMOL/L — SIGNIFICANT CHANGE UP (ref 3.5–5.3)
POTASSIUM SERPL-MCNC: 5.1 MMOL/L — SIGNIFICANT CHANGE UP (ref 3.5–5.3)
POTASSIUM SERPL-SCNC: 4.1 MMOL/L — SIGNIFICANT CHANGE UP (ref 3.5–5.3)
POTASSIUM SERPL-SCNC: 4.4 MMOL/L — SIGNIFICANT CHANGE UP (ref 3.5–5.3)
POTASSIUM SERPL-SCNC: 4.5 MMOL/L — SIGNIFICANT CHANGE UP (ref 3.5–5.3)
POTASSIUM SERPL-SCNC: 5.1 MMOL/L — SIGNIFICANT CHANGE UP (ref 3.5–5.3)
RBC # BLD: 4.03 M/UL — LOW (ref 4.2–5.8)
RBC # FLD: 11.6 % — SIGNIFICANT CHANGE UP (ref 10.3–14.5)
SODIUM SERPL-SCNC: 121 MMOL/L — LOW (ref 135–145)
SODIUM SERPL-SCNC: 121 MMOL/L — LOW (ref 135–145)
SODIUM SERPL-SCNC: 122 MMOL/L — LOW (ref 135–145)
SODIUM SERPL-SCNC: 124 MMOL/L — LOW (ref 135–145)
WBC # BLD: 7.16 K/UL — SIGNIFICANT CHANGE UP (ref 3.8–10.5)
WBC # FLD AUTO: 7.16 K/UL — SIGNIFICANT CHANGE UP (ref 3.8–10.5)

## 2020-08-04 PROCEDURE — 99232 SBSQ HOSP IP/OBS MODERATE 35: CPT

## 2020-08-04 PROCEDURE — 99233 SBSQ HOSP IP/OBS HIGH 50: CPT | Mod: GC

## 2020-08-04 RX ORDER — SODIUM CHLORIDE 5 G/100ML
500 INJECTION, SOLUTION INTRAVENOUS
Refills: 0 | Status: DISCONTINUED | OUTPATIENT
Start: 2020-08-04 | End: 2020-08-04

## 2020-08-04 RX ADMIN — FINASTERIDE 5 MILLIGRAM(S): 5 TABLET, FILM COATED ORAL at 11:29

## 2020-08-04 RX ADMIN — ENOXAPARIN SODIUM 40 MILLIGRAM(S): 100 INJECTION SUBCUTANEOUS at 11:29

## 2020-08-04 RX ADMIN — SODIUM CHLORIDE 30 MILLILITER(S): 5 INJECTION, SOLUTION INTRAVENOUS at 00:50

## 2020-08-04 RX ADMIN — POLYETHYLENE GLYCOL 3350 17 GRAM(S): 17 POWDER, FOR SOLUTION ORAL at 11:29

## 2020-08-04 RX ADMIN — TAMSULOSIN HYDROCHLORIDE 0.4 MILLIGRAM(S): 0.4 CAPSULE ORAL at 22:09

## 2020-08-04 RX ADMIN — SODIUM CHLORIDE 30 MILLILITER(S): 5 INJECTION, SOLUTION INTRAVENOUS at 11:57

## 2020-08-04 RX ADMIN — AMLODIPINE BESYLATE 5 MILLIGRAM(S): 2.5 TABLET ORAL at 06:10

## 2020-08-04 NOTE — PROGRESS NOTE ADULT - SUBJECTIVE AND OBJECTIVE BOX
[facility] DIVISION OF KIDNEY DISEASES AND HYPERTENSION   -- FOLLOW UP NOTE --   Efe Lau  Nephrology Fellow  Pager NS: [phone]   /  Pager LIROME: [phone]  (after 5pm or weekend please page the on-call fellow)  --------------------------------------------------------------------------------  24 hour events/subjective:  - overnight pt given 2% hypertonic for 4 hours, vitals afebrile no hypotensive episode, total UOP 1.1L in the past 24hr  - patient seen and examined at bedside this morning without complaints report symptoms overall resolve  - vitals/lab/medications reviewed, noted for serum Na 118 to 119 to 121    PAST HISTORY  --------------------------------------------------------------------------------  No significant changes to PMH, PSH, FHx, SHx, unless otherwise noted    ALLERGIES & MEDICATIONS  --------------------------------------------------------------------------------  Allergies    No Known Allergies    Intolerances    Standing Inpatient Medications  amLODIPine   Tablet 5 milliGRAM(s) Oral daily  enoxaparin Injectable 40 milliGRAM(s) SubCutaneous daily  finasteride 5 milliGRAM(s) Oral daily  polyethylene glycol 3350 17 Gram(s) Oral daily  sodium chloride 2% . 500 milliLiter(s) IV Continuous <Continuous>  tamsulosin 0.4 milliGRAM(s) Oral at bedtime    PRN Inpatient Medications  senna 2 Tablet(s) Oral at bedtime PRN    REVIEW OF SYSTEMS  --------------------------------------------------------------------------------  Gen: no fever, chills, weakness  Respiratory: No dyspnea, cough  CV: No chest pain, orthopnea  GI: No abdominal pain, nausea, vomiting, diarrhea  MSK: no edema  Neuro: No dizziness, lightheadedness  Heme: No bleeding  All other systems were reviewed and are negative, except as noted.    VITALS/PHYSICAL EXAM  --------------------------------------------------------------------------------  T(C): 36.3 (08-04-20 @ 14:15), Max: 36.7 (08-04-20 @ 05:05)  HR: 78 (08-04-20 @ 14:15) (66 - 78)  BP: 125/76 (08-04-20 @ 14:15) (117/68 - 151/81)  RR: 18 (08-04-20 @ 14:15) (16 - 18)  SpO2: 98% (08-04-20 @ 14:15) (96% - 98%)  Wt(kg): --    08-03-20 @ 07:01  -  08-04-20 @ 07:00  --------------------------------------------------------  IN: 510 mL / OUT: 1145 mL / NET: -635 mL    08-04-20 @ 07:01  -  08-04-20 @ 14:35  --------------------------------------------------------  IN: 480 mL / OUT: 700 mL / NET: -220 mL    Physical Exam:              Gen: NAD, well-appearing on room air  	HEENT: moist mucous membrane  	Pulm: CTA B/L, no crackles  	CV: RRR, S1S2; no rub/murmur  	GI: +BS, soft, nontender/nondistended  	: sutton catheter in place w/ clear urine  	MSK: Warm, no edema, no asterixis              Neuro: AAOx3  	Psych: Normal affect and mood  	Skin: Warm    LABS/STUDIES  --------------------------------------------------------------------------------              12.5   7.16  >-----------<  270      [08-04-20 @ 07:15]              34.8     122  |  91  |  25  ----------------------------<  115      [08-04-20 @ 10:29]  4.1   |  19  |  1.11        Ca     8.6     [08-04-20 @ 10:29]      Mg     1.9     [08-04-20 @ 07:15]      Phos  2.0     [08-04-20 @ 07:15]    Creatinine Trend:  SCr 1.11 [08-04 @ 10:29]  SCr 1.18 [08-04 @ 07:15]  SCr 1.25 [08-03 @ 22:29]  SCr 1.24 [08-03 @ 18:21]  SCr 1.46 [08-03 @ 14:22]    Urinalysis - [08-02-20 @ 09:49]      Color Light Yellow / Appearance Clear / SG 1.019 / pH 7.0      Gluc Negative / Ketone Negative  / Bili Negative / Urobili Negative       Blood Negative / Protein Trace / Leuk Est Negative / Nitrite Negative      RBC 5 / WBC 0 / Hyaline 0 / Gran  / Sq Epi  / Non Sq Epi 0 / Bacteria Negative    Urine Sodium 128      [08-02-20 @ 09:49]  Urine Potassium 46      [08-02-20 @ 09:49]  Urine Osmolality 377      [08-04-20 @ 09:19]

## 2020-08-04 NOTE — PROGRESS NOTE ADULT - PROBLEM SELECTOR PLAN 3
Recently prescribed Bactrim by PMD for urinary retention. Found to be retaining in ED w/ bladder scan 700'sml, sutton placed.  - cont sutton, urodynamic studies outpt   - on home tamsulosin and finasteride Recently prescribed Bactrim by PMD for urinary retention. Found to be retaining in ED w/ bladder scan 700'sml, sutton placed.  - cont sutton   - on home tamsulosin and finasteride

## 2020-08-04 NOTE — PROGRESS NOTE ADULT - SUBJECTIVE AND OBJECTIVE BOX
PROGRESS NOTE:   Authored by Dr. Claire Willoughby MD, Pager [phone] [facility], [address] [facility]     Patient is a 84y old  Male who presents with a chief complaint of hyponatremia (03 Aug 2020 12:27)      SUBJECTIVE / OVERNIGHT EVENTS: No events overnight.    ADDITIONAL REVIEW OF SYSTEMS: Denies fevers, chills, n/v.    MEDICATIONS  (STANDING):  amLODIPine   Tablet 5 milliGRAM(s) Oral daily  enoxaparin Injectable 40 milliGRAM(s) SubCutaneous daily  finasteride 5 milliGRAM(s) Oral daily  polyethylene glycol 3350 17 Gram(s) Oral daily  sodium chloride 2% . 500 milliLiter(s) (30 mL/Hr) IV Continuous <Continuous>  tamsulosin 0.4 milliGRAM(s) Oral at bedtime    MEDICATIONS  (PRN):  senna 2 Tablet(s) Oral at bedtime PRN if constipated      CAPILLARY BLOOD GLUCOSE        I&O's Summary    03 Aug 2020 07:01  -  04 Aug 2020 07:00  --------------------------------------------------------  IN: 510 mL / OUT: 1145 mL / NET: -635 mL        PHYSICAL EXAM:  Vital Signs Last 24 Hrs  T(C): 36.7 (04 Aug 2020 05:05), Max: 36.7 (04 Aug 2020 05:05)  T(F): 98.1 (04 Aug 2020 05:05), Max: 98.1 (04 Aug 2020 05:05)  HR: 66 (04 Aug 2020 05:05) (66 - 79)  BP: 127/72 (04 Aug 2020 05:05) (100/58 - 151/81)  BP(mean): --  RR: 18 (04 Aug 2020 05:05) (16 - 18)  SpO2: 96% (04 Aug 2020 05:05) (96% - 98%)    CONSTITUTIONAL: NAD, lying in bed comfortably  RESPIRATORY: Normal respiratory effort; CTABL  CARDIOVASCULAR: Regular rate and rhythm, normal S1 and S2, no murmur/rub/gallop  ABDOMEN: Soft, nondistended, nontender to palpation, normoactive bowel sounds, no rebound/guarding. Sellers in place.  MUSCLOSKELETAL: no joint swelling or tenderness to palpation, FROM all extremities  NEURO: AAOx3 to person, place, and time, full and equal strength all extremities   EXTREMITIES: no pedal edema    LABS:                        12.4   9.01  )-----------( 277      ( 03 Aug 2020 07:09 )             34.3     08-03    119<LL>  |  90<L>  |  27<H>  ----------------------------<  110<H>  4.5   |  19<L>  |  1.25    Ca    8.5      03 Aug 2020 22:29  Phos  2.1     08-03  Mg     1.8     08-03            Urinalysis Basic - ( 02 Aug 2020 09:49 )    Color: Light Yellow / Appearance: Clear / S.019 / pH: x  Gluc: x / Ketone: Negative  / Bili: Negative / Urobili: Negative   Blood: x / Protein: Trace / Nitrite: Negative   Leuk Esterase: Negative / RBC: 5 /hpf / WBC 0 /HPF   Sq Epi: x / Non Sq Epi: 0 /hpf / Bacteria: Negative        Culture - Urine (collected 02 Aug 2020 14:09)  Source: .Urine Clean Catch (Midstream)  Final Report (03 Aug 2020 10:12):    No growth        RADIOLOGY & ADDITIONAL TESTS: PROGRESS NOTE:   Authored by Dr. Claire Willoughby MD, Pager [phone] [facility], [address] [facility]     Patient is a 84y old  Male who presents with a chief complaint of hyponatremia (03 Aug 2020 12:27)      SUBJECTIVE / OVERNIGHT EVENTS: No events overnight. Seen this AM ( [id number]), reports feeling less dizzy, no headaches today. Reports feeling better after sutton was placed at admission as he was having difficulty urinating at home.     ADDITIONAL REVIEW OF SYSTEMS: Denies fevers, chills, n/v.    MEDICATIONS  (STANDING):  amLODIPine   Tablet 5 milliGRAM(s) Oral daily  enoxaparin Injectable 40 milliGRAM(s) SubCutaneous daily  finasteride 5 milliGRAM(s) Oral daily  polyethylene glycol 3350 17 Gram(s) Oral daily  sodium chloride 2% . 500 milliLiter(s) (30 mL/Hr) IV Continuous <Continuous>  tamsulosin 0.4 milliGRAM(s) Oral at bedtime    MEDICATIONS  (PRN):  senna 2 Tablet(s) Oral at bedtime PRN if constipated      CAPILLARY BLOOD GLUCOSE        I&O's Summary    03 Aug 2020 07:01  -  04 Aug 2020 07:00  --------------------------------------------------------  IN: 510 mL / OUT: 1145 mL / NET: -635 mL        PHYSICAL EXAM:  Vital Signs Last 24 Hrs  T(C): 36.7 (04 Aug 2020 05:05), Max: 36.7 (04 Aug 2020 05:05)  T(F): 98.1 (04 Aug 2020 05:05), Max: 98.1 (04 Aug 2020 05:05)  HR: 66 (04 Aug 2020 05:05) (66 - 79)  BP: 127/72 (04 Aug 2020 05:05) (100/58 - 151/81)  BP(mean): --  RR: 18 (04 Aug 2020 05:05) (16 - 18)  SpO2: 96% (04 Aug 2020 05:05) (96% - 98%)    CONSTITUTIONAL: NAD, lying in bed comfortably  RESPIRATORY: Normal respiratory effort; CTABL  CARDIOVASCULAR: Regular rate and rhythm, normal S1 and S2, no murmur/rub/gallop  ABDOMEN: Soft, nondistended, nontender to palpation, normoactive bowel sounds, no rebound/guarding. Sutton in place.  MUSCLOSKELETAL: no joint swelling or tenderness to palpation, FROM all extremities  NEURO: AAOx3 to person, place, and time, full and equal strength all extremities   EXTREMITIES: no pedal edema    LABS:                        12.4   9.01  )-----------( 277      ( 03 Aug 2020 07:09 )             34.3     08-03    119<LL>  |  90<L>  |  27<H>  ----------------------------<  110<H>  4.5   |  19<L>  |  1.25    Ca    8.5      03 Aug 2020 22:29  Phos  2.1     08-03  Mg     1.8     08-03            Urinalysis Basic - ( 02 Aug 2020 09:49 )    Color: Light Yellow / Appearance: Clear / S.019 / pH: x  Gluc: x / Ketone: Negative  / Bili: Negative / Urobili: Negative   Blood: x / Protein: Trace / Nitrite: Negative   Leuk Esterase: Negative / RBC: 5 /hpf / WBC 0 /HPF   Sq Epi: x / Non Sq Epi: 0 /hpf / Bacteria: Negative        Culture - Urine (collected 02 Aug 2020 14:09)  Source: .Urine Clean Catch (Midstream)  Final Report (03 Aug 2020 10:12):    No growth

## 2020-08-04 NOTE — PROGRESS NOTE ADULT - ATTENDING COMMENTS
Hyponatremia: clinically improved  Multifactorial hyponatremia: urinary retention, bactrim use causing nausea and increased ADH state  Na overall improved  Encourage po intake and fluid restrict 1 liter; 2% nacl for 4 hours if not correcting and eventually can place on NACL tabs 1g tid  Would trend daily uosm  Give na phos for phos repletion  Trial of void and needs urology for followup outpt - was going to see Dr Dominique Ott of [facility]-     Kristen Vallejo MD  Off: [phone]  Cell: [phone]

## 2020-08-04 NOTE — PROGRESS NOTE ADULT - ATTENDING COMMENTS
84 M, Vietnamese-speaking with h/o HTN, BPH presenting with dizziness, nausea and insomnia for 10 days, recently prescribed Bactrim by PMD,  Found to have Na 113, mild HA and dizziness. Has been on ARB/diuretic, no N/V/D.    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

## 2020-08-04 NOTE — PROGRESS NOTE ADULT - PROBLEM SELECTOR PLAN 2
PALMA likely 2/2 post obstructive diuresis   On admission sCr 1.26, increased to 1.70 (post sutton significant UOP) then improved 1.38 on 8/3.  Urinalysis trace protein with rbc.  Last sCr 1.5 likely 2/2 dehydration post obstructive diuresis >3.5L UOP while fluid restricted, resolving today sCr 1.2    monitor BMP  Avoid nephrotoxic agents (NSAIDs, PPI, contrast)  strict I/O

## 2020-08-04 NOTE — PROGRESS NOTE ADULT - PROBLEM SELECTOR PLAN 1
Na 113 at admit. Likely multifactorial 2/2 diuretics vs. SIADH given high urine sodium, high urine osmol and low serum osmol, possibly in setting of recent rx Bactrim. Now slowly improving  - fluid restrict 1L. Goal correction rate 6-8 mEq/L over 24 hrs.   - Close monitor BMP q4  - s/p 1 dose DDVAP and D5W for rapid correction. Now hypertonic saline 2% 30cc/hr 4hrs  - nephro consulted, appreciate recs Na 113 at admit. Likely multifactorial 2/2 diuretics vs. SIADH given high urine sodium, high urine osmol and low serum osmol, possibly in setting of recent rx Bactrim. Now slowly improving  - fluid restrict 1L. Goal correction rate 6-8 mEq/L over 24 hrs.   - Close monitor BMP q4  - s/p 1 dose DDVAP and D5W for rapid correction. Now on hypertonic saline 2% 30cc/hr 4hrs  - nephro consulted, appreciate recs

## 2020-08-04 NOTE — PROGRESS NOTE ADULT - ASSESSMENT
85 yo Serbian-speaking M PMH of HTN, BPH presenting with dizziness, nausea and insomnia for 10 days, recently prescribed Bactrim by PMD for urinary retention. Found to have Na 113, admitted for hyponatremia. 85 yo Indonesian-speaking M PMH of HTN, BPH presenting with dizziness, nausea and insomnia for 10 days, recently prescribed Bactrim by PMD for urinary retention. Found to have Na 113, admitted for hyponatremia, now slowly improving.

## 2020-08-04 NOTE — PROGRESS NOTE ADULT - PROBLEM SELECTOR PLAN 4
DVT ppx: Lovenox 40 subQ  Diet: Regular DVT ppx: Lovenox 40 subQ  Diet: Regular w/ 1L fluid restriction

## 2020-08-04 NOTE — PROGRESS NOTE ADULT - PROBLEM SELECTOR PLAN 1
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  In ED, patient was given IVF NS with sNa uptrend to 115 mEq.  On admission sNa 113 (8/2 5am)-->120 (8/2 ~8pm)-->118 (8/3 ~7am)-->117 (8/3 ~10am) -->121 (8/4~7am)      Recommend  Please repeat BMP this afternoon  Continue fluid restriction, avoid bactrim or hypotonic fluid including additive to IV medications  GOAL serum Na today 5pm is 121-122 and tomorrow 8/4 5am 125-127  continue monitor BMP q4hr, avoid rapid correction >6-8 mEq/24hr or >12-18 mEq/48hrs to avoid risk Osmotic Demyelination Syndrome (ODS)

## 2020-08-05 ENCOUNTER — TRANSCRIPTION ENCOUNTER (OUTPATIENT)
Age: 85
End: 2020-08-05

## 2020-08-05 LAB
ANION GAP SERPL CALC-SCNC: 13 MMOL/L — SIGNIFICANT CHANGE UP (ref 5–17)
ANION GAP SERPL CALC-SCNC: 14 MMOL/L — SIGNIFICANT CHANGE UP (ref 5–17)
ANION GAP SERPL CALC-SCNC: 14 MMOL/L — SIGNIFICANT CHANGE UP (ref 5–17)
ANION GAP SERPL CALC-SCNC: 15 MMOL/L — SIGNIFICANT CHANGE UP (ref 5–17)
ANION GAP SERPL CALC-SCNC: 16 MMOL/L — SIGNIFICANT CHANGE UP (ref 5–17)
ANION GAP SERPL CALC-SCNC: 17 MMOL/L — SIGNIFICANT CHANGE UP (ref 5–17)
BUN SERPL-MCNC: 27 MG/DL — HIGH (ref 7–23)
BUN SERPL-MCNC: 29 MG/DL — HIGH (ref 7–23)
BUN SERPL-MCNC: 30 MG/DL — HIGH (ref 7–23)
BUN SERPL-MCNC: 31 MG/DL — HIGH (ref 7–23)
BUN SERPL-MCNC: 34 MG/DL — HIGH (ref 7–23)
BUN SERPL-MCNC: 34 MG/DL — HIGH (ref 7–23)
CALCIUM SERPL-MCNC: 8.8 MG/DL — SIGNIFICANT CHANGE UP (ref 8.4–10.5)
CALCIUM SERPL-MCNC: 9 MG/DL — SIGNIFICANT CHANGE UP (ref 8.4–10.5)
CALCIUM SERPL-MCNC: 9.2 MG/DL — SIGNIFICANT CHANGE UP (ref 8.4–10.5)
CALCIUM SERPL-MCNC: 9.3 MG/DL — SIGNIFICANT CHANGE UP (ref 8.4–10.5)
CALCIUM SERPL-MCNC: 9.4 MG/DL — SIGNIFICANT CHANGE UP (ref 8.4–10.5)
CALCIUM SERPL-MCNC: 9.6 MG/DL — SIGNIFICANT CHANGE UP (ref 8.4–10.5)
CHLORIDE SERPL-SCNC: 93 MMOL/L — LOW (ref 96–108)
CHLORIDE SERPL-SCNC: 95 MMOL/L — LOW (ref 96–108)
CHLORIDE SERPL-SCNC: 96 MMOL/L — SIGNIFICANT CHANGE UP (ref 96–108)
CHLORIDE SERPL-SCNC: 98 MMOL/L — SIGNIFICANT CHANGE UP (ref 96–108)
CO2 SERPL-SCNC: 16 MMOL/L — LOW (ref 22–31)
CO2 SERPL-SCNC: 16 MMOL/L — LOW (ref 22–31)
CO2 SERPL-SCNC: 17 MMOL/L — LOW (ref 22–31)
CO2 SERPL-SCNC: 18 MMOL/L — LOW (ref 22–31)
CO2 SERPL-SCNC: 19 MMOL/L — LOW (ref 22–31)
CO2 SERPL-SCNC: 19 MMOL/L — LOW (ref 22–31)
CREAT SERPL-MCNC: 1.19 MG/DL — SIGNIFICANT CHANGE UP (ref 0.5–1.3)
CREAT SERPL-MCNC: 1.25 MG/DL — SIGNIFICANT CHANGE UP (ref 0.5–1.3)
CREAT SERPL-MCNC: 1.3 MG/DL — SIGNIFICANT CHANGE UP (ref 0.5–1.3)
CREAT SERPL-MCNC: 1.34 MG/DL — HIGH (ref 0.5–1.3)
CREAT SERPL-MCNC: 1.35 MG/DL — HIGH (ref 0.5–1.3)
CREAT SERPL-MCNC: 1.44 MG/DL — HIGH (ref 0.5–1.3)
GLUCOSE SERPL-MCNC: 100 MG/DL — HIGH (ref 70–99)
GLUCOSE SERPL-MCNC: 107 MG/DL — HIGH (ref 70–99)
GLUCOSE SERPL-MCNC: 119 MG/DL — HIGH (ref 70–99)
GLUCOSE SERPL-MCNC: 129 MG/DL — HIGH (ref 70–99)
GLUCOSE SERPL-MCNC: 132 MG/DL — HIGH (ref 70–99)
GLUCOSE SERPL-MCNC: 152 MG/DL — HIGH (ref 70–99)
HCT VFR BLD CALC: 35.8 % — LOW (ref 39–50)
HGB BLD-MCNC: 12.8 G/DL — LOW (ref 13–17)
MAGNESIUM SERPL-MCNC: 2.1 MG/DL — SIGNIFICANT CHANGE UP (ref 1.6–2.6)
MCHC RBC-ENTMCNC: 31.1 PG — SIGNIFICANT CHANGE UP (ref 27–34)
MCHC RBC-ENTMCNC: 35.8 GM/DL — SIGNIFICANT CHANGE UP (ref 32–36)
MCV RBC AUTO: 87.1 FL — SIGNIFICANT CHANGE UP (ref 80–100)
NRBC # BLD: 0 /100 WBCS — SIGNIFICANT CHANGE UP (ref 0–0)
OSMOLALITY UR: 258 MOS/KG — LOW (ref 300–900)
PHOSPHATE SERPL-MCNC: 2.5 MG/DL — SIGNIFICANT CHANGE UP (ref 2.5–4.5)
PLATELET # BLD AUTO: 309 K/UL — SIGNIFICANT CHANGE UP (ref 150–400)
POTASSIUM SERPL-MCNC: 4.6 MMOL/L — SIGNIFICANT CHANGE UP (ref 3.5–5.3)
POTASSIUM SERPL-MCNC: 4.6 MMOL/L — SIGNIFICANT CHANGE UP (ref 3.5–5.3)
POTASSIUM SERPL-MCNC: 4.7 MMOL/L — SIGNIFICANT CHANGE UP (ref 3.5–5.3)
POTASSIUM SERPL-MCNC: 4.9 MMOL/L — SIGNIFICANT CHANGE UP (ref 3.5–5.3)
POTASSIUM SERPL-MCNC: 5 MMOL/L — SIGNIFICANT CHANGE UP (ref 3.5–5.3)
POTASSIUM SERPL-MCNC: 5.1 MMOL/L — SIGNIFICANT CHANGE UP (ref 3.5–5.3)
POTASSIUM SERPL-SCNC: 4.6 MMOL/L — SIGNIFICANT CHANGE UP (ref 3.5–5.3)
POTASSIUM SERPL-SCNC: 4.6 MMOL/L — SIGNIFICANT CHANGE UP (ref 3.5–5.3)
POTASSIUM SERPL-SCNC: 4.7 MMOL/L — SIGNIFICANT CHANGE UP (ref 3.5–5.3)
POTASSIUM SERPL-SCNC: 4.9 MMOL/L — SIGNIFICANT CHANGE UP (ref 3.5–5.3)
POTASSIUM SERPL-SCNC: 5 MMOL/L — SIGNIFICANT CHANGE UP (ref 3.5–5.3)
POTASSIUM SERPL-SCNC: 5.1 MMOL/L — SIGNIFICANT CHANGE UP (ref 3.5–5.3)
RBC # BLD: 4.11 M/UL — LOW (ref 4.2–5.8)
RBC # FLD: 11.9 % — SIGNIFICANT CHANGE UP (ref 10.3–14.5)
SODIUM SERPL-SCNC: 126 MMOL/L — LOW (ref 135–145)
SODIUM SERPL-SCNC: 127 MMOL/L — LOW (ref 135–145)
SODIUM SERPL-SCNC: 128 MMOL/L — LOW (ref 135–145)
SODIUM SERPL-SCNC: 129 MMOL/L — LOW (ref 135–145)
WBC # BLD: 7.86 K/UL — SIGNIFICANT CHANGE UP (ref 3.8–10.5)
WBC # FLD AUTO: 7.86 K/UL — SIGNIFICANT CHANGE UP (ref 3.8–10.5)

## 2020-08-05 PROCEDURE — 99232 SBSQ HOSP IP/OBS MODERATE 35: CPT | Mod: GC

## 2020-08-05 PROCEDURE — 99232 SBSQ HOSP IP/OBS MODERATE 35: CPT

## 2020-08-05 RX ORDER — LIDOCAINE 4 G/100G
1 CREAM TOPICAL ONCE
Refills: 0 | Status: COMPLETED | OUTPATIENT
Start: 2020-08-05 | End: 2020-08-05

## 2020-08-05 RX ORDER — SODIUM CHLORIDE 9 MG/ML
1 INJECTION INTRAMUSCULAR; INTRAVENOUS; SUBCUTANEOUS THREE TIMES A DAY
Refills: 0 | Status: DISCONTINUED | OUTPATIENT
Start: 2020-08-05 | End: 2020-08-06

## 2020-08-05 RX ADMIN — SODIUM CHLORIDE 1 GRAM(S): 9 INJECTION INTRAMUSCULAR; INTRAVENOUS; SUBCUTANEOUS at 21:46

## 2020-08-05 RX ADMIN — SODIUM CHLORIDE 1 GRAM(S): 9 INJECTION INTRAMUSCULAR; INTRAVENOUS; SUBCUTANEOUS at 14:29

## 2020-08-05 RX ADMIN — POLYETHYLENE GLYCOL 3350 17 GRAM(S): 17 POWDER, FOR SOLUTION ORAL at 12:46

## 2020-08-05 RX ADMIN — AMLODIPINE BESYLATE 5 MILLIGRAM(S): 2.5 TABLET ORAL at 05:36

## 2020-08-05 RX ADMIN — TAMSULOSIN HYDROCHLORIDE 0.4 MILLIGRAM(S): 0.4 CAPSULE ORAL at 21:46

## 2020-08-05 RX ADMIN — ENOXAPARIN SODIUM 40 MILLIGRAM(S): 100 INJECTION SUBCUTANEOUS at 12:46

## 2020-08-05 RX ADMIN — FINASTERIDE 5 MILLIGRAM(S): 5 TABLET, FILM COATED ORAL at 12:46

## 2020-08-05 RX ADMIN — LIDOCAINE 1 APPLICATION(S): 4 CREAM TOPICAL at 20:00

## 2020-08-05 NOTE — PROCEDURE NOTE - ADDITIONAL PROCEDURE DETAILS
UROLOGY PROGRESS NOTE:     Called to see patient for difficult sutton placement.  Unsuccessful attempt by nursing staff.  18F coude sutton placed under typical sterile technique without difficutly  No complications.  Patient tolerated procedure well.  ~500cc clear yellow urine drained.  Sutton plan as per primary team.

## 2020-08-05 NOTE — PROGRESS NOTE ADULT - PROBLEM SELECTOR PLAN 2
PALMA likely 2/2 post obstructive diuresis   On admission sCr 1.26, increased to 1.70 (post sutton significant UOP) then improved 1.38 on 8/3.  Urinalysis trace protein with rbc.  Last sCr 1.5 likely 2/2 dehydration post obstructive diuresis >3.5L UOP while fluid restricted, resolving today sCr 1.1    monitor BMP  Avoid nephrotoxic agents (NSAIDs, PPI, contrast)  strict I/O PALMA likely 2/2 post obstructive diuresis, RESOLVED   On admission sCr 1.26, increased to 1.70 (post sutton significant UOP) then improved 1.38 on 8/3.  Urinalysis trace protein with rbc.  Last sCr 1.5 likely 2/2 dehydration post obstructive diuresis >3.5L UOP while fluid restricted, resolving today sCr 1.1    monitor BMP  Avoid nephrotoxic agents (NSAIDs, PPI, contrast)  strict I/O

## 2020-08-05 NOTE — DISCHARGE NOTE PROVIDER - NSDCFUSCHEDAPPT_GEN_ALL_CORE_FT
SHELTON BOONE ; 08/07/2020 ; [facility] [address] [place] [address] SHELTON BOONE ; 08/07/2020 ; [facility] [address] [place] [address] SHELTON BOONE ; 08/07/2020 ; [facility] [address] [place] [address] SHELTON BOONE ; 08/07/2020 ; [facility] [address] [place] [address] SHELTON BOONE ; 08/07/2020 ; [facility] [address] [place] [address] SHELTON BOONE ; 08/07/2020 ; [facility] [address] [place] [address]

## 2020-08-05 NOTE — PROGRESS NOTE ADULT - SUBJECTIVE AND OBJECTIVE BOX
[facility] DIVISION OF KIDNEY DISEASES AND HYPERTENSION   -- FOLLOW UP NOTE --   Efe Lau  Nephrology Fellow  Pager NS: [phone]   /  Pager [facility]: [phone]  (after 5pm or weekend please page the on-call fellow)  --------------------------------------------------------------------------------  24 hour events/subjective:  - overnight no events reported, vitals afebrile no hypotensive episode, total UOP 2.7 L in the past 24hr  - patient seen and examined at bedside this morning without complaints  - vitals/lab/medications reviewed, noted for serum Na improving 121 to 128 in past 24 hrs    PAST HISTORY  --------------------------------------------------------------------------------  No significant changes to PMH, PSH, FHx, SHx, unless otherwise noted    ALLERGIES & MEDICATIONS  --------------------------------------------------------------------------------  Allergies    No Known Allergies    Intolerances    Standing Inpatient Medications  amLODIPine   Tablet 5 milliGRAM(s) Oral daily  enoxaparin Injectable 40 milliGRAM(s) SubCutaneous daily  finasteride 5 milliGRAM(s) Oral daily  polyethylene glycol 3350 17 Gram(s) Oral daily  tamsulosin 0.4 milliGRAM(s) Oral at bedtime    PRN Inpatient Medications  senna 2 Tablet(s) Oral at bedtime PRN    REVIEW OF SYSTEMS  --------------------------------------------------------------------------------  Gen: no fever, chills, weakness  Respiratory: No dyspnea, cough  CV: No chest pain, orthopnea  GI: No abdominal pain, nausea, vomiting, diarrhea  MSK: no edema  Neuro: No dizziness, lightheadedness  Heme: No bleeding  All other systems were reviewed and are negative, except as noted.    VITALS/PHYSICAL EXAM  --------------------------------------------------------------------------------  T(C): 36.6 (08-05-20 @ 05:32), Max: 36.8 (08-04-20 @ 21:25)  HR: 75 (08-05-20 @ 05:32) (75 - 82)  BP: 130/84 (08-05-20 @ 05:32) (113/58 - 130/84)  RR: 18 (08-05-20 @ 05:32) (18 - 18)  SpO2: 98% (08-05-20 @ 05:32) (98% - 98%)  Wt(kg): --    08-04-20 @ 07:01  -  08-05-20 @ 07:00  --------------------------------------------------------  IN: 960 mL / OUT: 2700 mL / NET: -1740 mL    Physical Exam:              Gen: NAD, well-appearing on room air  	HEENT: moist mucous membrane  	Pulm: CTA B/L, no crackles  	CV: RRR, S1S2; no rub/murmur  	GI: +BS, soft, nontender/nondistended  	: sutton catheter in place w/ clear urine  	MSK: Warm, no edema, no asterixis              Neuro: AAOx3  	Psych: Normal affect and mood  	Skin: Warm    LABS/STUDIES  --------------------------------------------------------------------------------              12.8   7.86  >-----------<  309      [08-05-20 @ 06:59]              35.8     128  |  95  |  27  ----------------------------<  100      [08-05-20 @ 06:59]  4.7   |  19  |  1.19        Ca     9.0     [08-05-20 @ 06:59]      Mg     2.1     [08-05-20 @ 06:59]      Phos  2.5     [08-05-20 @ 06:59]    Creatinine Trend:  SCr 1.19 [08-05 @ 06:59]  SCr 1.25 [08-04 @ 23:54]  SCr 1.24 [08-04 @ 19:40]  SCr 1.15 [08-04 @ 15:01]  SCr 1.11 [08-04 @ 10:29]    Urinalysis - [08-02-20 @ 09:49]      Color Light Yellow / Appearance Clear / SG 1.019 / pH 7.0      Gluc Negative / Ketone Negative  / Bili Negative / Urobili Negative       Blood Negative / Protein Trace / Leuk Est Negative / Nitrite Negative      RBC 5 / WBC 0 / Hyaline 0 / Gran  / Sq Epi  / Non Sq Epi 0 / Bacteria Negative    Urine Sodium 128      [08-02-20 @ 09:49]  Urine Potassium 46      [08-02-20 @ 09:49]  Urine Osmolality 377      [08-04-20 @ 09:19]

## 2020-08-05 NOTE — PROGRESS NOTE ADULT - ATTENDING COMMENTS
84 M, Chinese-speaking with h/o HTN, BPH presenting with dizziness, nausea and insomnia for 10 days, recently prescribed Bactrim by PMD,  Found to have Na 113, mild HA and dizziness. Has been on ARB/diuretic, no N/V/D.    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 Scr 1.3     c/w Flomax, Proscar    Urology f/u outpatient  ** BP- stable     On Amlodipine  ** Yokasta Doherty is aware of the plan

## 2020-08-05 NOTE — PROGRESS NOTE ADULT - SUBJECTIVE AND OBJECTIVE BOX
PROGRESS NOTE:   Authored by Dr. Claire Willoughby MD, Pager [phone] [facility] [place] [address] [facility]     Patient is a 84y old  Male who presents with a chief complaint of hyponatremia (04 Aug 2020 14:35)      SUBJECTIVE / OVERNIGHT EVENTS: No events overnight.    ADDITIONAL REVIEW OF SYSTEMS: Denies fevers, chills, n/v.    MEDICATIONS  (STANDING):  amLODIPine   Tablet 5 milliGRAM(s) Oral daily  enoxaparin Injectable 40 milliGRAM(s) SubCutaneous daily  finasteride 5 milliGRAM(s) Oral daily  polyethylene glycol 3350 17 Gram(s) Oral daily  tamsulosin 0.4 milliGRAM(s) Oral at bedtime    MEDICATIONS  (PRN):  senna 2 Tablet(s) Oral at bedtime PRN if constipated      CAPILLARY BLOOD GLUCOSE        I&O's Summary    03 Aug 2020 07:01  -  04 Aug 2020 07:00  --------------------------------------------------------  IN: 510 mL / OUT: 1145 mL / NET: -635 mL    04 Aug 2020 07:01  -  05 Aug 2020 06:55  --------------------------------------------------------  IN: 960 mL / OUT: 2700 mL / NET: -1740 mL        PHYSICAL EXAM:  Vital Signs Last 24 Hrs  T(C): 36.6 (05 Aug 2020 05:32), Max: 36.8 (04 Aug 2020 21:25)  T(F): 97.9 (05 Aug 2020 05:32), Max: 98.3 (04 Aug 2020 21:25)  HR: 75 (05 Aug 2020 05:32) (75 - 82)  BP: 130/84 (05 Aug 2020 05:32) (113/58 - 130/84)  BP(mean): --  RR: 18 (05 Aug 2020 05:32) (18 - 18)  SpO2: 98% (05 Aug 2020 05:32) (98% - 98%)    CONSTITUTIONAL: NAD, lying in bed comfortably  RESPIRATORY: Normal respiratory effort; CTABL  CARDIOVASCULAR: Regular rate and rhythm, normal S1 and S2, no murmur/rub/gallop  ABDOMEN: Soft, nondistended, nontender to palpation, normoactive bowel sounds, no rebound/guarding  MUSCLOSKELETAL: no joint swelling or tenderness to palpation, FROM all extremities  NEURO: AAOx3 to person, place, and time, full and equal strength all extremities   EXTREMITIES: no pedal edema    LABS:                        12.5   7.16  )-----------( 270      ( 04 Aug 2020 07:15 )             34.8     08-04    126<L>  |  95<L>  |  29<H>  ----------------------------<  107<H>  4.9   |  18<L>  |  1.25    Ca    8.8      04 Aug 2020 23:54  Phos  2.0     08-04  Mg     1.9     08-04                Culture - Urine (collected 02 Aug 2020 14:09)  Source: .Urine Clean Catch (Midstream)  Final Report (03 Aug 2020 10:12):    No growth        RADIOLOGY & ADDITIONAL TESTS: PROGRESS NOTE:   Authored by Dr. Claire Willoughby MD, Pager [phone] [facility], [id number] [facility]     Patient is a 84y old  Male who presents with a chief complaint of hyponatremia (04 Aug 2020 14:35)      SUBJECTIVE / OVERNIGHT EVENTS: No events overnight. Pt seen this AM reporting trouble sleeping due to noise, however no dizziness or headaches.     ADDITIONAL REVIEW OF SYSTEMS: Denies fevers, chills, n/v.    MEDICATIONS  (STANDING):  amLODIPine   Tablet 5 milliGRAM(s) Oral daily  enoxaparin Injectable 40 milliGRAM(s) SubCutaneous daily  finasteride 5 milliGRAM(s) Oral daily  polyethylene glycol 3350 17 Gram(s) Oral daily  tamsulosin 0.4 milliGRAM(s) Oral at bedtime    MEDICATIONS  (PRN):  senna 2 Tablet(s) Oral at bedtime PRN if constipated      CAPILLARY BLOOD GLUCOSE        I&O's Summary    03 Aug 2020 07:01  -  04 Aug 2020 07:00  --------------------------------------------------------  IN: 510 mL / OUT: 1145 mL / NET: -635 mL    04 Aug 2020 07:01  -  05 Aug 2020 06:55  --------------------------------------------------------  IN: 960 mL / OUT: 2700 mL / NET: -1740 mL        PHYSICAL EXAM:  Vital Signs Last 24 Hrs  T(C): 36.6 (05 Aug 2020 05:32), Max: 36.8 (04 Aug 2020 21:25)  T(F): 97.9 (05 Aug 2020 05:32), Max: 98.3 (04 Aug 2020 21:25)  HR: 75 (05 Aug 2020 05:32) (75 - 82)  BP: 130/84 (05 Aug 2020 05:32) (113/58 - 130/84)  BP(mean): --  RR: 18 (05 Aug 2020 05:32) (18 - 18)  SpO2: 98% (05 Aug 2020 05:32) (98% - 98%)    CONSTITUTIONAL: NAD, lying in bed comfortably  RESPIRATORY: Normal respiratory effort; CTABL  CARDIOVASCULAR: Regular rate and rhythm, normal S1 and S2, no murmur/rub/gallop  ABDOMEN: Soft, nondistended, nontender to palpation, normoactive bowel sounds, no rebound/guarding. Sellers in place.  MUSCLOSKELETAL: no joint swelling or tenderness to palpation, FROM all extremities  NEURO: AAOx3 to person, place, and time, full and equal strength all extremities   EXTREMITIES: no pedal edema    LABS:                        12.5   7.16  )-----------( 270      ( 04 Aug 2020 07:15 )             34.8     08-04    126<L>  |  95<L>  |  29<H>  ----------------------------<  107<H>  4.9   |  18<L>  |  1.25    Ca    8.8      04 Aug 2020 23:54  Phos  2.0     08-04  Mg     1.9     08-04        Culture - Urine (collected 02 Aug 2020 14:09)  Source: .Urine Clean Catch (Midstream)  Final Report (03 Aug 2020 10:12):    No growth

## 2020-08-05 NOTE — PROGRESS NOTE ADULT - PROBLEM SELECTOR PLAN 3
Recently prescribed Bactrim by PMD for urinary retention. Found to be retaining in ED w/ bladder scan 700'sml, sutton placed.  - cont sutton   - on home tamsulosin and finasteride Recently prescribed Bactrim by PMD for urinary retention. Found to be retaining in ED w/ bladder scan 700'sml, sutton placed.  - cont sutton   - on home tamsulosin and finasteride  - Pt has first visit appt to see urologist Dr. Ott outpt on Friday. Will keep as scheduled.

## 2020-08-05 NOTE — DISCHARGE NOTE PROVIDER - NSDCCPCAREPLAN_GEN_ALL_CORE_FT
PRINCIPAL DISCHARGE DIAGNOSIS  Diagnosis: Hyponatremia  Assessment and Plan of Treatment: You were found to have a blood sodium level of 113 (normal levels are 135-145) in the ED, which was likely the cause of your headaches, dizziness, and insomnia. Your low sodium was likely caused by multiple factors, including diuretics use, Bactrim use, and urinary retention. Your sodium was slowly corrected to 131 (rapid correction can cause brain injury) and you were started on sodium chloride tablets. Please continue to take the sodium tablets 1 tab twice a day and follow-up with your primary care provider within the week to recheck your sodium level.      SECONDARY DISCHARGE DIAGNOSES  Diagnosis: Urinary retention  Assessment and Plan of Treatment: You were found to have 700cc of urine retention in the ED, likely due to your enlarged prostate. A sutton was placed to help drain the urine. Sutton removal was attempted however you were still retaining urine and the sutton was placed back in. Please follow-up with your appointment with Dr. Ott on 8/7 after discharge for further management.

## 2020-08-05 NOTE — PROGRESS NOTE ADULT - PROBLEM SELECTOR PLAN 1
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  In ED, patient was given IVF NS with sNa uptrend to 115 mEq.  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  In ED, patient was given IVF NS with sNa uptrend to 115 mEq.  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  In ED, patient was given IVF NS with sNa uptrend to 115 mEq.  On admission sNa 113 (8/2 5am)-->120 (8/2 ~8pm)-->118 (8/3 ~7am)-->117 (8/3 ~10am) -->121 (8/4~7am)-->128 (8/5 ~7am)      Recommend  serum Na improving appropriately, goal for discharge sNa>133  if repeat sNa decrease start salt tab 1g TID  Continue fluid restriction, avoid bactrim or hypotonic fluid including additive to IV medications  continue monitor BMP q12hr

## 2020-08-05 NOTE — PROGRESS NOTE ADULT - ASSESSMENT
83 yo Tamazight-speaking M PMH of HTN, BPH presenting with dizziness, nausea and insomnia for 10 days, recently prescribed Bactrim by PMD for urinary retention. Found to have Na 113, admitted for hyponatremia, now slowly improving. 85 yo Indonesian-speaking M PMH of HTN, BPH presenting with dizziness, nausea and insomnia for 10 days, recently prescribed Bactrim by PMD for urinary retention. 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 Bactrim held. Now slowly improving.

## 2020-08-05 NOTE — DISCHARGE NOTE PROVIDER - HOSPITAL COURSE
83 yo Luxembourgish-speaking male with PMH HTN, BPH presented with dizziness, nausea and insomnia for 10 days duration. One episode of NBNB vomiting. Of note he had difficulty urinating and PMD started patient on Bactrim 2 days before presenting to the hospital. In the ED pt was found to be hyponatremic to 113. Serum osmol was low, urine sodium high, and urine osmol high consistent with SIADH. 83 yo Japanese-speaking male with PMH HTN, BPH presented with dizziness, nausea and insomnia for 10 days duration. One episode of NBNB vomiting. Of note he had difficulty urinating and PMD started patient on Bactrim 2 days before presenting to the hospital. In the ED pt was found to be hyponatremic to 113. Pt was also retaining 700cc on bladder scan in the ED and sutton was placed. Serum osmol was low, urine sodium high, and urine osmol high consistent with SIADH. Hyponatremia likely multifactorial given pt was also on diuretics (losartan-HCTZ for HTN) with SIADH likely 2/2 Bactrim use.        Pt was put on 1L fluid restriction. 1 dose DDVAP was given. Na initially corrected too rapidly from 115 to 120 within 12 hours and was given D5W to slow down correction rate. Na was subsequently corrected with goal 6-8meq increase per 24 hrs with addition of slow infusion hypertonic 2% saline 30cc/hr for 4 hrs when needed. Sodium corrected to 128 over 72hrs and pt was started on sodium tabs 1g TID.         In terms of urinary retention, pt on home flomax and proscar TOV was performed 85 yo Bulgarian-speaking male with PMH HTN, BPH presented with dizziness, nausea, headache and insomnia for 10 days duration. One episode of NBNB vomiting. Of note he had difficulty urinating and PMD started patient on Bactrim 2 days before presenting to the hospital. In the ED pt was found to be hyponatremic to 113. Pt was also retaining 700cc on bladder scan in the ED and sutton was placed. Serum osmol was low, urine sodium high, and urine osmol high consistent with SIADH. Hyponatremia likely multifactorial given pt was also on diuretics (losartan-HCTZ for HTN) with SIADH likely 2/2 Bactrim use.        Pt was put on 1L fluid restriction. 1 dose DDVAP was given. Na initially corrected too rapidly from 115 to 120 within 12 hours and was given D5W to slow down correction rate. Na was subsequently corrected with goal 6-8meq increase per 24 hrs with addition of slow infusion hypertonic 2% saline 30cc/hr for 4 hrs when needed. Sodium corrected to 128 over 72hrs and pt was started on sodium tabs 1g TID. Pt's dizziness and headache improved. No focal neurological deficits throughout hospital stay.         In terms of urinary retention 2/2 BPH, pt already on flomax and proscar which was continued during hospital stay. TOV was performed, which _______. Pt has a first visit appointment scheduled with Dr. Dominique Ott (urology) on Friday. 85 yo Armenian-speaking male with PMH HTN, BPH presented with dizziness, nausea, headache and insomnia for 10 days duration. One episode of NBNB vomiting. Of note he had difficulty urinating and PMD started patient on Bactrim 2 days before presenting to the hospital. In the ED pt was found to be hyponatremic to 113. Pt was also retaining 700cc on bladder scan in the ED and sutton was placed. Serum osmol was low, urine sodium high, and urine osmol high consistent with SIADH. Hyponatremia likely multifactorial given pt was also on diuretics (losartan-HCTZ for HTN) with SIADH likely 2/2 Bactrim use.        Pt was put on 1L fluid restriction. 1 dose DDVAP was given. Na initially corrected too rapidly from 115 to 120 within 12 hours and was given D5W to slow down correction rate. Na was subsequently corrected with goal 6-8meq increase per 24 hrs with addition of slow infusion hypertonic 2% saline 30cc/hr for 4 hrs when needed. Sodium corrected to 128 over 72hrs and pt was started on sodium tabs 1g TID. Pt's dizziness and headache improved. No focal neurological deficits throughout hospital stay.         In terms of urinary retention 2/2 BPH, pt already on flomax and proscar which was continued during hospital stay. TOV was attempted however failed, and sutton was replaced by urology.        Na level now corrected to 131. Pt remains asymptomatic and stable for discharge. Will be discharged on sodium chloride 1g tabs BID with follow-up BMP and mangament by PCP. Pt will be dischaged with sutton bag, has first visit appointment scheduled with Dr. Dominique Ott (urology) on Friday 8/7. 85 yo Bengali-speaking male with PMH HTN, BPH presented with dizziness, nausea, headache and insomnia for 10 days duration. One episode of NBNB vomiting. Of note he had difficulty urinating and PMD started patient on Bactrim 2 days before presenting to the hospital. In the ED pt was found to be hyponatremic to 113. Pt was also retaining 700cc on bladder scan in the ED and sutton was placed. Serum osmol was low, urine sodium high, and urine osmol high consistent with SIADH. Hyponatremia likely multifactorial given pt was also on diuretics (losartan-HCTZ for HTN) with SIADH likely 2/2 Bactrim use.        Pt was put on 1L fluid restriction. 1 dose DDVAP was given. Na initially corrected too rapidly from 115 to 120 within 12 hours and was given D5W to slow down correction rate. Na was subsequently corrected with goal 6-8meq increase per 24 hrs with addition of slow infusion hypertonic 2% saline 30cc/hr for 4 hrs when needed. Sodium corrected to 128 over 72hrs and pt was started on sodium tabs 1g TID. Pt's dizziness and headache improved. No focal neurological deficits throughout hospital stay.         In terms of urinary retention 2/2 BPH, pt already on flomax and proscar which was continued during hospital stay. TOV was attempted however failed, and sutton was replaced by urology.        Na level now corrected to 131. Pt remains asymptomatic and stable for discharge. Will be discharged on sodium chloride 1g tabs BID with follow-up BMP and mangament by PCP. Pt will be dischaged with sutton bag, has first visit appointment scheduled with Dr. Dominique Ott (urology) on Friday 8/7/20.

## 2020-08-05 NOTE — DISCHARGE NOTE PROVIDER - NSDCFUADDAPPT_GEN_ALL_CORE_FT
Please follow-up with your PCP within 1 week. Please follow-up with your PCP, Dr. Leonila Thompson ([phone]) within 1 week for a repeat bmp to monitor your sodium level.

## 2020-08-05 NOTE — DISCHARGE NOTE PROVIDER - NSDCMRMEDTOKEN_GEN_ALL_CORE_FT
Ambien 10 mg oral tablet: 1 tab(s) orally once a day (at bedtime)  Bactrim  mg-160 mg oral tablet: 1 tab(s) orally every 12 hours  finasteride 5 mg oral tablet: 1 tab(s) orally once a day  Hyzaar 100 mg-25 mg oral tablet: 1 tab(s) orally once a day  tamsulosin 0.4 mg oral capsule: 1 cap(s) orally once a day Ambien 10 mg oral tablet: 1 tab(s) orally once a day (at bedtime)  amLODIPine 5 mg oral tablet: 1 tab(s) orally once a day   finasteride 5 mg oral tablet: 1 tab(s) orally once a day  sodium chloride 1 g oral tablet: 1 tab(s) orally 2 times a day   tamsulosin 0.4 mg oral capsule: 1 cap(s) orally once a day

## 2020-08-05 NOTE — PROCEDURE NOTE - NSURITECHNIQUE_GU_A_CORE
The collection bag is below the level of the patient and urinary bladder/The catheter was secured with a securement device (e.g. StatLock)/The catheter was appropriately lubricated/Sterile gloves were worn for the duration of the procedure/The urinary drainage system is closed at the end of the procedure/Proper hand hygiene was performed/A sterile drape was used to cover all adjacent areas

## 2020-08-05 NOTE — PROGRESS NOTE ADULT - PROBLEM SELECTOR PLAN 1
Na 113 at admit. Likely multifactorial 2/2 diuretics vs. SIADH given high urine sodium, high urine osmol and low serum osmol, possibly in setting of recent rx Bactrim. Now slowly improving  - fluid restrict 1L. Goal correction rate 6-8 mEq/L over 24 hrs.   - Close monitor BMP q4  - s/p 1 dose DDVAP and D5W for rapid correction. Now on hypertonic saline 2% 30cc/hr 4hrs  - nephro consulted, appreciate recs Na 113 at admit. Likely multifactorial 2/2 diuretics vs. urinary obstruction vs. SIADH  given high urine sodium, high urine osmol and low serum osmol, possibly in setting of recent rx Bactrim. Now slowly improving. Pt w/o neuro deficits.   - fluid restrict 1L. Goal correction rate 6-8 mEq/L over 24 hrs.   - Close monitor BMP q4  - s/p 1 dose DDVAP and D5W for rapid correction. S/p multiple hypertonic saline 2% 30cc/hr 4hrs.   - nephro consulted, appreciate recs Na 113 at admit. Likely multifactorial 2/2 diuretics vs. urinary obstruction vs. SIADH  given high urine sodium, high urine osmol and low serum osmol, possibly in setting of recent rx Bactrim. Now slowly improving. Pt w/o neuro deficits.   - fluid restrict 1L. Goal correction rate 6-8 mEq/L over 24 hrs.   - Close monitor BMP q4  - s/p 1 dose DDVAP and D5W for rapid correction. S/p multiple hypertonic saline 2% 30cc/hr 4hrs.   - Started on Salt tabs 1g TID  - nephro consulted, appreciate recs

## 2020-08-05 NOTE — PROGRESS NOTE ADULT - PROBLEM SELECTOR PLAN 2
Was taking losartan-HCTZ at home  - hold thiazide for now given hyponatremia   - on amlodipine 5mg daily  - Monitor BP Was taking losartan-HCTZ at home  - hold thiazide given hyponatremia   - on amlodipine 5mg daily  - Monitor BP

## 2020-08-06 ENCOUNTER — TRANSCRIPTION ENCOUNTER (OUTPATIENT)
Age: 85
End: 2020-08-06

## 2020-08-06 VITALS
DIASTOLIC BLOOD PRESSURE: 72 MMHG | RESPIRATION RATE: 17 BRPM | HEART RATE: 81 BPM | OXYGEN SATURATION: 98 % | SYSTOLIC BLOOD PRESSURE: 124 MMHG | TEMPERATURE: 98 F

## 2020-08-06 LAB
ANION GAP SERPL CALC-SCNC: 13 MMOL/L — SIGNIFICANT CHANGE UP (ref 5–17)
ANION GAP SERPL CALC-SCNC: 15 MMOL/L — SIGNIFICANT CHANGE UP (ref 5–17)
ANION GAP SERPL CALC-SCNC: 16 MMOL/L — SIGNIFICANT CHANGE UP (ref 5–17)
BUN SERPL-MCNC: 30 MG/DL — HIGH (ref 7–23)
BUN SERPL-MCNC: 31 MG/DL — HIGH (ref 7–23)
BUN SERPL-MCNC: 33 MG/DL — HIGH (ref 7–23)
CALCIUM SERPL-MCNC: 8.9 MG/DL — SIGNIFICANT CHANGE UP (ref 8.4–10.5)
CALCIUM SERPL-MCNC: 9 MG/DL — SIGNIFICANT CHANGE UP (ref 8.4–10.5)
CALCIUM SERPL-MCNC: 9.1 MG/DL — SIGNIFICANT CHANGE UP (ref 8.4–10.5)
CHLORIDE SERPL-SCNC: 98 MMOL/L — SIGNIFICANT CHANGE UP (ref 96–108)
CHLORIDE SERPL-SCNC: 99 MMOL/L — SIGNIFICANT CHANGE UP (ref 96–108)
CHLORIDE SERPL-SCNC: 99 MMOL/L — SIGNIFICANT CHANGE UP (ref 96–108)
CO2 SERPL-SCNC: 16 MMOL/L — LOW (ref 22–31)
CO2 SERPL-SCNC: 17 MMOL/L — LOW (ref 22–31)
CO2 SERPL-SCNC: 20 MMOL/L — LOW (ref 22–31)
CREAT SERPL-MCNC: 1.2 MG/DL — SIGNIFICANT CHANGE UP (ref 0.5–1.3)
CREAT SERPL-MCNC: 1.25 MG/DL — SIGNIFICANT CHANGE UP (ref 0.5–1.3)
CREAT SERPL-MCNC: 1.26 MG/DL — SIGNIFICANT CHANGE UP (ref 0.5–1.3)
GLUCOSE SERPL-MCNC: 108 MG/DL — HIGH (ref 70–99)
GLUCOSE SERPL-MCNC: 118 MG/DL — HIGH (ref 70–99)
GLUCOSE SERPL-MCNC: 157 MG/DL — HIGH (ref 70–99)
MAGNESIUM SERPL-MCNC: 2.1 MG/DL — SIGNIFICANT CHANGE UP (ref 1.6–2.6)
PHOSPHATE SERPL-MCNC: 3 MG/DL — SIGNIFICANT CHANGE UP (ref 2.5–4.5)
POTASSIUM SERPL-MCNC: 4.4 MMOL/L — SIGNIFICANT CHANGE UP (ref 3.5–5.3)
POTASSIUM SERPL-MCNC: 4.5 MMOL/L — SIGNIFICANT CHANGE UP (ref 3.5–5.3)
POTASSIUM SERPL-MCNC: 4.7 MMOL/L — SIGNIFICANT CHANGE UP (ref 3.5–5.3)
POTASSIUM SERPL-SCNC: 4.4 MMOL/L — SIGNIFICANT CHANGE UP (ref 3.5–5.3)
POTASSIUM SERPL-SCNC: 4.5 MMOL/L — SIGNIFICANT CHANGE UP (ref 3.5–5.3)
POTASSIUM SERPL-SCNC: 4.7 MMOL/L — SIGNIFICANT CHANGE UP (ref 3.5–5.3)
SODIUM SERPL-SCNC: 131 MMOL/L — LOW (ref 135–145)

## 2020-08-06 PROCEDURE — 97161 PT EVAL LOW COMPLEX 20 MIN: CPT

## 2020-08-06 PROCEDURE — 83930 ASSAY OF BLOOD OSMOLALITY: CPT

## 2020-08-06 PROCEDURE — 71045 X-RAY EXAM CHEST 1 VIEW: CPT

## 2020-08-06 PROCEDURE — 85027 COMPLETE CBC AUTOMATED: CPT

## 2020-08-06 PROCEDURE — 70450 CT HEAD/BRAIN W/O DYE: CPT

## 2020-08-06 PROCEDURE — 97530 THERAPEUTIC ACTIVITIES: CPT

## 2020-08-06 PROCEDURE — 97116 GAIT TRAINING THERAPY: CPT

## 2020-08-06 PROCEDURE — 84295 ASSAY OF SERUM SODIUM: CPT

## 2020-08-06 PROCEDURE — 84132 ASSAY OF SERUM POTASSIUM: CPT

## 2020-08-06 PROCEDURE — 82803 BLOOD GASES ANY COMBINATION: CPT

## 2020-08-06 PROCEDURE — 86769 SARS-COV-2 COVID-19 ANTIBODY: CPT

## 2020-08-06 PROCEDURE — 96374 THER/PROPH/DIAG INJ IV PUSH: CPT | Mod: XU

## 2020-08-06 PROCEDURE — 82947 ASSAY GLUCOSE BLOOD QUANT: CPT

## 2020-08-06 PROCEDURE — 83735 ASSAY OF MAGNESIUM: CPT

## 2020-08-06 PROCEDURE — 84484 ASSAY OF TROPONIN QUANT: CPT

## 2020-08-06 PROCEDURE — 84300 ASSAY OF URINE SODIUM: CPT

## 2020-08-06 PROCEDURE — 82962 GLUCOSE BLOOD TEST: CPT

## 2020-08-06 PROCEDURE — 83935 ASSAY OF URINE OSMOLALITY: CPT

## 2020-08-06 PROCEDURE — 81001 URINALYSIS AUTO W/SCOPE: CPT

## 2020-08-06 PROCEDURE — 85014 HEMATOCRIT: CPT

## 2020-08-06 PROCEDURE — 99239 HOSP IP/OBS DSCHRG MGMT >30: CPT | Mod: GC

## 2020-08-06 PROCEDURE — 80048 BASIC METABOLIC PNL TOTAL CA: CPT

## 2020-08-06 PROCEDURE — 87086 URINE CULTURE/COLONY COUNT: CPT

## 2020-08-06 PROCEDURE — 51702 INSERT TEMP BLADDER CATH: CPT

## 2020-08-06 PROCEDURE — 82435 ASSAY OF BLOOD CHLORIDE: CPT

## 2020-08-06 PROCEDURE — 84133 ASSAY OF URINE POTASSIUM: CPT

## 2020-08-06 PROCEDURE — 99285 EMERGENCY DEPT VISIT HI MDM: CPT | Mod: 25

## 2020-08-06 PROCEDURE — 93005 ELECTROCARDIOGRAM TRACING: CPT | Mod: XU

## 2020-08-06 PROCEDURE — 80053 COMPREHEN METABOLIC PANEL: CPT

## 2020-08-06 PROCEDURE — 83605 ASSAY OF LACTIC ACID: CPT

## 2020-08-06 PROCEDURE — 82330 ASSAY OF CALCIUM: CPT

## 2020-08-06 PROCEDURE — 84100 ASSAY OF PHOSPHORUS: CPT

## 2020-08-06 PROCEDURE — 99232 SBSQ HOSP IP/OBS MODERATE 35: CPT | Mod: GC

## 2020-08-06 RX ORDER — TAMSULOSIN HYDROCHLORIDE 0.4 MG/1
1 CAPSULE ORAL
Qty: 0 | Refills: 0 | DISCHARGE

## 2020-08-06 RX ORDER — TAMSULOSIN HYDROCHLORIDE 0.4 MG/1
1 CAPSULE ORAL
Qty: 30 | Refills: 0
Start: 2020-08-06 | End: 2020-09-04

## 2020-08-06 RX ORDER — ZOLPIDEM TARTRATE 10 MG/1
1 TABLET ORAL
Qty: 0 | Refills: 0 | DISCHARGE

## 2020-08-06 RX ORDER — FINASTERIDE 5 MG/1
1 TABLET, FILM COATED ORAL
Qty: 30 | Refills: 0
Start: 2020-08-06 | End: 2020-09-04

## 2020-08-06 RX ORDER — SODIUM CHLORIDE 9 MG/ML
1 INJECTION INTRAMUSCULAR; INTRAVENOUS; SUBCUTANEOUS
Qty: 14 | Refills: 0
Start: 2020-08-06 | End: 2020-08-12

## 2020-08-06 RX ORDER — AMLODIPINE BESYLATE 2.5 MG/1
1 TABLET ORAL
Qty: 30 | Refills: 0
Start: 2020-08-06 | End: 2020-09-04

## 2020-08-06 RX ORDER — ZOLPIDEM TARTRATE 10 MG/1
1 TABLET ORAL
Qty: 30 | Refills: 0
Start: 2020-08-06 | End: 2020-09-04

## 2020-08-06 RX ORDER — LOSARTAN/HYDROCHLOROTHIAZIDE 100MG-25MG
1 TABLET ORAL
Qty: 0 | Refills: 0 | DISCHARGE

## 2020-08-06 RX ORDER — FINASTERIDE 5 MG/1
1 TABLET, FILM COATED ORAL
Qty: 0 | Refills: 0 | DISCHARGE

## 2020-08-06 RX ADMIN — ENOXAPARIN SODIUM 40 MILLIGRAM(S): 100 INJECTION SUBCUTANEOUS at 11:34

## 2020-08-06 RX ADMIN — FINASTERIDE 5 MILLIGRAM(S): 5 TABLET, FILM COATED ORAL at 11:34

## 2020-08-06 RX ADMIN — SODIUM CHLORIDE 1 GRAM(S): 9 INJECTION INTRAMUSCULAR; INTRAVENOUS; SUBCUTANEOUS at 13:22

## 2020-08-06 RX ADMIN — AMLODIPINE BESYLATE 5 MILLIGRAM(S): 2.5 TABLET ORAL at 05:05

## 2020-08-06 RX ADMIN — SODIUM CHLORIDE 1 GRAM(S): 9 INJECTION INTRAMUSCULAR; INTRAVENOUS; SUBCUTANEOUS at 05:05

## 2020-08-06 NOTE — PROGRESS NOTE ADULT - PROBLEM SELECTOR PLAN 1
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  In ED, patient was given IVF NS with sNa uptrend to 115 mEq.  On admission sNa 113 (8/2 5am), improving w/ 2% and fluid restriction, last sCr 131 today      Recommend  No objection from renal standpoint to be discharge  Please discharge on salt tab 1g TID, will need follow up w/ PMD within 1 week for repeat BMP (serum Na) to assess trend.  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  In ED, patient was given IVF NS with sNa uptrend to 115 mEq.  On admission sNa 113 (8/2 5am), improving w/ 2% and fluid restriction, last sCr 131 today      Recommend  No objection from renal standpoint to be discharge  Please discharge on salt tab 1g BID, will need follow up w/ PMD within 1 week for repeat BMP (serum Na) to assess trend.  Can be discontinued if sNa >140 outpatient  Continue fluid restriction, avoid bactrim, HCTZ

## 2020-08-06 NOTE — PROGRESS NOTE ADULT - ASSESSMENT
85 yo Telugu-speaking M PMH of HTN, BPH presenting with dizziness, nausea and insomnia for 10 days, recently prescribed Bactrim by PMD for urinary retention. 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 Bactrim held. Now slowly improving. 83 yo Macedonian-speaking M PMH of HTN, BPH presenting with dizziness, nausea and insomnia for 10 days, recently prescribed Bactrim by PMD for urinary retention. 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 Bactrim held. Na level now corrected. Also with urinary retention on admission, TOV failed, will d'c with sutton and follow-up with Dr. Ott urologist tomorrow outpt.

## 2020-08-06 NOTE — PROGRESS NOTE ADULT - REASON FOR ADMISSION
hyponatremia

## 2020-08-06 NOTE — PROGRESS NOTE ADULT - PROVIDER SPECIALTY LIST ADULT
Internal Medicine
Nephrology

## 2020-08-06 NOTE — PROGRESS NOTE ADULT - PROBLEM SELECTOR PLAN 2
Was taking losartan-HCTZ at home  - hold thiazide given hyponatremia   - on amlodipine 5mg daily  - Monitor BP

## 2020-08-06 NOTE — PROGRESS NOTE ADULT - PROBLEM SELECTOR PROBLEM 2
PALMA (acute kidney injury)
HTN (hypertension)
PALMA (acute kidney injury)

## 2020-08-06 NOTE — PROGRESS NOTE ADULT - PROBLEM SELECTOR PLAN 1
Na 113 at admit. Likely multifactorial 2/2 diuretics vs. urinary obstruction vs. SIADH  given high urine sodium, high urine osmol and low serum osmol, possibly in setting of recent rx Bactrim. Now slowly improving. Pt w/o neuro deficits.   - fluid restrict 1L. Goal correction rate 6-8 mEq/L over 24 hrs.   - Close monitor BMP q4  - s/p 1 dose DDVAP and D5W for rapid correction. S/p multiple hypertonic saline 2% 30cc/hr 4hrs.   - Started on Salt tabs 1g TID  - nephro consulted, appreciate recs Na 113 at admit. Likely multifactorial 2/2 diuretics vs. urinary obstruction vs. SIADH  given high urine sodium, high urine osmol and low serum osmol, possibly in setting of recent rx Bactrim. Now resolved. Pt w/o neuro deficits.   - fluid restrict 1L. Goal correction rate 6-8 mEq/L over 24 hrs.   - s/p 1 dose DDVAP and D5W for rapid correction. S/p multiple hypertonic saline 2% 30cc/hr 4hrs.   - Started on Salt tabs 1g TID  - nephro consulted, appreciate recs

## 2020-08-06 NOTE — PROGRESS NOTE ADULT - SUBJECTIVE AND OBJECTIVE BOX
PROGRESS NOTE:   Authored by Dr. Claire Willoughby MD, Pager [phone] [facility], [id number] [address]     Patient is a 84y old  Male who presents with a chief complaint of hyponatremia (05 Aug 2020 17:29)      SUBJECTIVE / OVERNIGHT EVENTS: No events overnight.    ADDITIONAL REVIEW OF SYSTEMS: Denies fevers, chills, n/v.    MEDICATIONS  (STANDING):  amLODIPine   Tablet 5 milliGRAM(s) Oral daily  enoxaparin Injectable 40 milliGRAM(s) SubCutaneous daily  finasteride 5 milliGRAM(s) Oral daily  polyethylene glycol 3350 17 Gram(s) Oral daily  sodium chloride 1 Gram(s) Oral three times a day  tamsulosin 0.4 milliGRAM(s) Oral at bedtime    MEDICATIONS  (PRN):  senna 2 Tablet(s) Oral at bedtime PRN if constipated      CAPILLARY BLOOD GLUCOSE        I&O's Summary    05 Aug 2020 07:01  -  06 Aug 2020 07:00  --------------------------------------------------------  IN: 720 mL / OUT: 2700 mL / NET: -1980 mL        PHYSICAL EXAM:  Vital Signs Last 24 Hrs  T(C): 36.7 (06 Aug 2020 04:44), Max: 36.7 (06 Aug 2020 04:44)  T(F): 98 (06 Aug 2020 04:44), Max: 98 (06 Aug 2020 04:44)  HR: 76 (06 Aug 2020 04:44) (74 - 80)  BP: 117/78 (06 Aug 2020 04:44) (117/78 - 121/72)  BP(mean): --  RR: 18 (06 Aug 2020 04:44) (17 - 18)  SpO2: 99% (06 Aug 2020 04:44) (97% - 99%)    CONSTITUTIONAL: NAD, lying in bed comfortably  RESPIRATORY: Normal respiratory effort; CTABL  CARDIOVASCULAR: Regular rate and rhythm, normal S1 and S2, no murmur/rub/gallop  ABDOMEN: Soft, nondistended, nontender to palpation, normoactive bowel sounds, no rebound/guarding  MUSCLOSKELETAL: no joint swelling or tenderness to palpation, FROM all extremities  NEURO: AAOx3 to person, place, and time, full and equal strength all extremities   EXTREMITIES: no pedal edema    LABS:                        12.8   7.86  )-----------( 309      ( 05 Aug 2020 06:59 )             35.8     08-06    131<L>  |  99  |  33<H>  ----------------------------<  157<H>  4.4   |  16<L>  |  1.26    Ca    8.9      06 Aug 2020 01:29  Phos  2.5     08-05  Mg     2.1     08-05                  RADIOLOGY & ADDITIONAL TESTS: PROGRESS NOTE:   Authored by Dr. Claire Willoughby MD, Pager [phone] [facility], [id number] [address]     Patient is a 84y old  Male who presents with a chief complaint of hyponatremia (05 Aug 2020 17:29)      SUBJECTIVE / OVERNIGHT EVENTS: No events overnight. Pt seen this AM without headaches or dizziness.     ADDITIONAL REVIEW OF SYSTEMS: Denies fevers, pain.    MEDICATIONS  (STANDING):  amLODIPine   Tablet 5 milliGRAM(s) Oral daily  enoxaparin Injectable 40 milliGRAM(s) SubCutaneous daily  finasteride 5 milliGRAM(s) Oral daily  polyethylene glycol 3350 17 Gram(s) Oral daily  sodium chloride 1 Gram(s) Oral three times a day  tamsulosin 0.4 milliGRAM(s) Oral at bedtime    MEDICATIONS  (PRN):  senna 2 Tablet(s) Oral at bedtime PRN if constipated      CAPILLARY BLOOD GLUCOSE        I&O's Summary    05 Aug 2020 07:01  -  06 Aug 2020 07:00  --------------------------------------------------------  IN: 720 mL / OUT: 2700 mL / NET: -1980 mL        PHYSICAL EXAM:  Vital Signs Last 24 Hrs  T(C): 36.7 (06 Aug 2020 04:44), Max: 36.7 (06 Aug 2020 04:44)  T(F): 98 (06 Aug 2020 04:44), Max: 98 (06 Aug 2020 04:44)  HR: 76 (06 Aug 2020 04:44) (74 - 80)  BP: 117/78 (06 Aug 2020 04:44) (117/78 - 121/72)  BP(mean): --  RR: 18 (06 Aug 2020 04:44) (17 - 18)  SpO2: 99% (06 Aug 2020 04:44) (97% - 99%)    CONSTITUTIONAL: NAD, lying in bed comfortably  RESPIRATORY: Normal respiratory effort; CTABL  CARDIOVASCULAR: Regular rate and rhythm, normal S1 and S2, no murmur/rub/gallop  ABDOMEN: Soft, nondistended, nontender to palpation, normoactive bowel sounds, no rebound/guarding. Sellers in place.  MUSCLOSKELETAL: no joint swelling or tenderness to palpation, FROM all extremities  NEURO: AAOx3 to person, place, and time, full and equal strength all extremities   EXTREMITIES: no pedal edema    LABS:                        12.8   7.86  )-----------( 309      ( 05 Aug 2020 06:59 )             35.8     08-06    131<L>  |  99  |  33<H>  ----------------------------<  157<H>  4.4   |  16<L>  |  1.26    Ca    8.9      06 Aug 2020 01:29  Phos  2.5     08-05  Mg     2.1     08-05      RADIOLOGY & ADDITIONAL TESTS:

## 2020-08-06 NOTE — PROGRESS NOTE ADULT - PROBLEM SELECTOR PLAN 3
Recently prescribed Bactrim by PMD for urinary retention. Found to be retaining in ED w/ bladder scan 700'sml, sutton placed.  - cont sutton   - on home tamsulosin and finasteride  - Pt has first visit appt to see urologist Dr. Ott outpt on Friday. Will keep as scheduled. Recently prescribed Bactrim by PMD for urinary retention. Found to be retaining in ED w/ bladder scan 700'sml, sutton placed.  - TOV failed, cont sutton   - on home tamsulosin and finasteride  - Pt has first visit appt to see urologist Dr. Ott outpt on Friday. Will keep as scheduled.

## 2020-08-06 NOTE — DISCHARGE NOTE NURSING/CASE MANAGEMENT/SOCIAL WORK - NSDCFUADDAPPT_GEN_ALL_CORE_FT
Please follow-up with your PCP, Dr. Leonila Thompson ([phone]) within 1 week for a repeat bmp to monitor your sodium level.

## 2020-08-06 NOTE — PROGRESS NOTE ADULT - PROBLEM SELECTOR PLAN 2
PALMA likely 2/2 post obstructive diuresis, RESOLVED   On admission sCr 1.26, peaked 1.70 likely 2/2 post sutton post obstructive diuresis now improving sCr 1.2    monitor BMP  Avoid nephrotoxic agents (NSAIDs, PPI, contrast)  strict I/O

## 2020-08-06 NOTE — PROGRESS NOTE ADULT - ATTENDING COMMENTS
84 M, Yi-speaking with h/o HTN, BPH presenting with dizziness, nausea and insomnia for 10 days, recently prescribed Bactrim by PMD,  Found to have Na 113, mild HA and dizziness. Has been on ARB/diuretic, no N/V/D.    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

## 2020-08-06 NOTE — PROGRESS NOTE ADULT - SUBJECTIVE AND OBJECTIVE BOX
[facility] DIVISION OF KIDNEY DISEASES AND HYPERTENSION   -- FOLLOW UP NOTE --   Efe Lau  Nephrology Fellow  Pager NS: [phone]   /  Pager LIROME: [phone]  (after 5pm or weekend please page the on-call fellow)  --------------------------------------------------------------------------------  24 hour events/subjective:  - overnight pt started on salt tabs, vitals afebrile no hypotensive episode, total UOP sutton 2.7 L in the past 24hr  - patient seen and examined at bedside this morning without complaints  - vitals/lab/medications reviewed, noted for sNa improving 131      PAST HISTORY  --------------------------------------------------------------------------------  No significant changes to PMH, PSH, FHx, SHx, unless otherwise noted    ALLERGIES & MEDICATIONS  --------------------------------------------------------------------------------  Allergies    No Known Allergies    Intolerances  Standing Inpatient Medications  amLODIPine   Tablet 5 milliGRAM(s) Oral daily  enoxaparin Injectable 40 milliGRAM(s) SubCutaneous daily  finasteride 5 milliGRAM(s) Oral daily  polyethylene glycol 3350 17 Gram(s) Oral daily  sodium chloride 1 Gram(s) Oral three times a day  tamsulosin 0.4 milliGRAM(s) Oral at bedtime    PRN Inpatient Medications  senna 2 Tablet(s) Oral at bedtime PRN    REVIEW OF SYSTEMS  --------------------------------------------------------------------------------  Gen: no fever, chills, weakness  Respiratory: No dyspnea, cough  CV: No chest pain, orthopnea  GI: No abdominal pain, nausea, vomiting, diarrhea  MSK: no edema  Neuro: No dizziness, lightheadedness  Heme: No bleeding  All other systems were reviewed and are negative, except as noted.    VITALS/PHYSICAL EXAM  --------------------------------------------------------------------------------  T(C): 36.7 (08-06-20 @ 04:44), Max: 36.7 (08-06-20 @ 04:44)  HR: 76 (08-06-20 @ 04:44) (74 - 80)  BP: 117/78 (08-06-20 @ 04:44) (117/78 - 121/72)  RR: 18 (08-06-20 @ 04:44) (17 - 18)  SpO2: 99% (08-06-20 @ 04:44) (97% - 99%)  Wt(kg): --    08-05-20 @ 07:01  -  08-06-20 @ 07:00  --------------------------------------------------------  IN: 720 mL / OUT: 2700 mL / NET: -1980 mL    Physical Exam:              Gen: NAD, well-appearing on room air  	HEENT: moist mucous membrane  	Pulm: CTA B/L, no crackles  	CV: RRR, S1S2; no rub/murmur  	GI: +BS, soft, nontender/nondistended  	: sutton catheter in place w/ clear urine  	MSK: Warm, no edema, no asterixis              Neuro: AAOx3  	Psych: Normal affect and mood  	Skin: Warm    LABS/STUDIES  --------------------------------------------------------------------------------              12.8   7.86  >-----------<  309      [08-05-20 @ 06:59]              35.8     131  |  98  |  31  ----------------------------<  118      [08-06-20 @ 10:05]  4.7   |  20  |  1.25        Ca     9.0     [08-06-20 @ 10:05]      Mg     2.1     [08-06-20 @ 07:18]      Phos  3.0     [08-06-20 @ 07:18]    Creatinine Trend:  SCr 1.25 [08-06 @ 10:05]  SCr 1.20 [08-06 @ 07:18]  SCr 1.26 [08-06 @ 01:29]  SCr 1.34 [08-05 @ 22:13]  SCr 1.30 [08-05 @ 18:59]    Urinalysis - [08-02-20 @ 09:49]      Color Light Yellow / Appearance Clear / SG 1.019 / pH 7.0      Gluc Negative / Ketone Negative  / Bili Negative / Urobili Negative       Blood Negative / Protein Trace / Leuk Est Negative / Nitrite Negative      RBC 5 / WBC 0 / Hyaline 0 / Gran  / Sq Epi  / Non Sq Epi 0 / Bacteria Negative    Urine Sodium 128      [08-02-20 @ 09:49]  Urine Potassium 46      [08-02-20 @ 09:49]  Urine Osmolality 258      [08-05-20 @ 09:41]

## 2020-08-07 ENCOUNTER — APPOINTMENT (OUTPATIENT)
Dept: UROLOGY | Facility: CLINIC | Age: 85
End: 2020-08-07
Payer: MEDICARE

## 2020-08-07 VITALS
BODY MASS INDEX: 19.48 KG/M2 | HEART RATE: 89 BPM | HEIGHT: 60 IN | RESPIRATION RATE: 16 BRPM | WEIGHT: 99.21 LBS | SYSTOLIC BLOOD PRESSURE: 140 MMHG | OXYGEN SATURATION: 98 % | TEMPERATURE: 97.6 F | DIASTOLIC BLOOD PRESSURE: 70 MMHG

## 2020-08-07 DIAGNOSIS — E87.1 HYPO-OSMOLALITY AND HYPONATREMIA: ICD-10-CM

## 2020-08-07 DIAGNOSIS — R33.8 OTHER RETENTION OF URINE: ICD-10-CM

## 2020-08-07 DIAGNOSIS — Z87.891 PERSONAL HISTORY OF NICOTINE DEPENDENCE: ICD-10-CM

## 2020-08-07 DIAGNOSIS — Z78.9 OTHER SPECIFIED HEALTH STATUS: ICD-10-CM

## 2020-08-07 DIAGNOSIS — N13.8 BENIGN PROSTATIC HYPERPLASIA WITH LOWER URINARY TRACT SYMPMS: ICD-10-CM

## 2020-08-07 DIAGNOSIS — I10 ESSENTIAL (PRIMARY) HYPERTENSION: ICD-10-CM

## 2020-08-07 DIAGNOSIS — N40.1 BENIGN PROSTATIC HYPERPLASIA WITH LOWER URINARY TRACT SYMPMS: ICD-10-CM

## 2020-08-07 DIAGNOSIS — Z63.4 DISAPPEARANCE AND DEATH OF FAMILY MEMBER: ICD-10-CM

## 2020-08-07 PROCEDURE — 99204 OFFICE O/P NEW MOD 45 MIN: CPT

## 2020-08-07 RX ORDER — FINASTERIDE 5 MG/1
5 TABLET, FILM COATED ORAL
Refills: 0 | Status: ACTIVE | COMMUNITY
Start: 2020-08-07

## 2020-08-07 RX ORDER — TAMSULOSIN HYDROCHLORIDE 0.4 MG/1
0.4 CAPSULE ORAL
Refills: 0 | Status: ACTIVE | COMMUNITY

## 2020-08-07 SDOH — SOCIAL STABILITY - SOCIAL INSECURITY: DISSAPEARANCE AND DEATH OF FAMILY MEMBER: Z63.4

## 2020-08-07 NOTE — ASSESSMENT
[FreeTextEntry1] : 85 yo M with BPH, urinary retention\par \par - Reviewed records from recent hospitalization\par - Instilled about 500ml of fluid into bladder via catheter and pt had very little urge to urinate. Catheter kept in at this time\par - Natural history of BPH and bladder outlet obstruction reviewed, risks of progression, risks of detrussor myopathy/areflexic bladder, bladder stones, UTI, worsening symptoms, risk of retention and other issues. \par All treatment options were reviewed. This included surveillance, all medical therapeutic options, all outlet procedures including office based, TURP, bipolar TURP, button vaporization, thulium/holmium, suprapubic/retropubic simple (open, robotic) prostatectomy. Also specific addressed the pros and cons of urolift. Unfortunately, we do not offer this procedure at this time\par - Pt would like to proceed with urolift procedure which is an office-based procedure. Pt will follow-up with Dr. Ribeiro

## 2020-08-07 NOTE — PHYSICAL EXAM
[General Appearance - Well Developed] : well developed [General Appearance - Well Nourished] : well nourished [Normal Appearance] : normal appearance [Well Groomed] : well groomed [General Appearance - In No Acute Distress] : no acute distress [Edema] : no peripheral edema [Respiration, Rhythm And Depth] : normal respiratory rhythm and effort [Exaggerated Use Of Accessory Muscles For Inspiration] : no accessory muscle use [Abdomen Soft] : soft [Abdomen Tenderness] : non-tender [Costovertebral Angle Tenderness] : no ~M costovertebral angle tenderness [Urethral Meatus] : meatus normal [Urinary Bladder Findings] : the bladder was normal on palpation [Scrotum] : the scrotum was normal [Epididymis] : the epididymides were normal [Testes Tenderness] : no tenderness of the testes [FreeTextEntry1] : 18 Fr coude catheter in place draining yellow urine [Testes Mass (___cm)] : there were no testicular masses [No Focal Deficits] : no focal deficits [Normal Station and Gait] : the gait and station were normal for the patient's age [] : no rash [Mood] : the mood was normal [Affect] : the affect was normal [Oriented To Time, Place, And Person] : oriented to person, place, and time [Not Anxious] : not anxious [No Palpable Adenopathy] : no palpable adenopathy

## 2020-08-07 NOTE — HISTORY OF PRESENT ILLNESS
[FreeTextEntry1] : 83 yo Hungarian speaking M with longstanding history of BPH\par Has been on dual medical therapy for many years\par Pt was actually pending urolift procedure with Dr. Ribeiro last week\par Procedure was postponed as pt was found to have a UTI\par Pt was hospitalized for hyponatremia and during hospitalization, pt was found to be in urinary retention and had catheter placed\par No issues since catheter placement - urine has been draining clear yellow\par No fever, chills, dizziness, confusion\par Here today for second opinion regarding his BPH and trial of void\par

## 2020-08-07 NOTE — REVIEW OF SYSTEMS
[Feeling Tired] : feeling tired [Urine Infection (bladder/kidney)] : bladder/kidney infection [Wake up at night to urinate  How many times?  ___] : wakes up to urinate [unfilled] times during the night [Leakage of urine with straining, coughing, laughing] : leakage of urine with straining, coughing, laughing [Strain or push to urinate] : strain or push to urinate [Depression] : depression [Negative] : Heme/Lymph [see HPI] : see HPI

## 2022-06-13 NOTE — ED PROVIDER NOTE - QRS
06/13/22 1302   Post-Acute Status   Discharge Delays None known at this time   Discharge Plan   Discharge Plan A Home Health [facility] to start services on 6.14.22.   86

## 2023-08-18 NOTE — PATIENT PROFILE ADULT - CAREGIVER NAME
Vitals capture started with the following parameters, Patient=Adult, Interval=5 min, Initial Pressure=180 mmHg, Deflation Rate=5 mmHg, Cuff placed on Right Arm Anastasiia Chen

## 2023-10-24 NOTE — ED PROVIDER NOTE - INTERPRETATION
05-Jan-2021 18:49 normal Glycopyrrolate Counseling:  I discussed with the patient the risks of glycopyrrolate including but not limited to skin rash, drowsiness, dry mouth, difficulty urinating, and blurred vision.

## 2024-04-23 NOTE — PHYSICAL THERAPY INITIAL EVALUATION ADULT - PATIENT/FAMILY/SIGNIFICANT OTHER GOALS STATEMENT, PT EVAL
Physical Therapy Functional Dry Needling Note      Date:  4/17/2024    Name: Blas Alcocer  Clinic Number: [id number]    Therapy Diagnosis:   Encounter Diagnosis   Name Primary?    Mechanical low back pain Yes     Physician: Mando Maier MD    Total Time:  14 min    Subjective      Pt reports: continued R>L lower back pain with sitting, feels low and next to the pelvis.  See note by Jennifer Manley, PT     Pain: 3/10  Location: bilateral low back    Objective      See EMR under MEDIA for written consent provided, signed, dated on 4/23/2024     Palpation Assessment to determine the necessity for Functional Dry Needling TTP: ILL, R>L lower lumbar paraspinals, distal > proximal QL.     Blas received the following manual therapy techniques: dry needling to B lumbar spine with 2-3 in needles with no adverse effects.    Homeostatic points:  1. Deep Radial  2. Greater Auricular  3. Spinal Accessory  4. Saphenous  5. Deep Fibular  6. Tibial  7. Greater Occipital  8. Suprascapular ( infraspinatus)  9. Lateral Antebrachial Cutaneous  10. Sural  11. Lateral Popliteal  12. Superficial Radial  13. Dorsal Scapular  14. Superior Cluneal  15. Posterior Cutaneous L 2  16. Inferior Gluteal  17. Lateral Pectoral  18. Ilitotibal  19. Infraorbital  20. Spinous process T7  21. Posterior cutaneous  T6  22. Posterior cutaneous L 5  23. Supraorbital  24. Common fibular    Paravertebral Points:  L2-S1 paraspinals    Symptomatic Points:   L3-5 mulitifidi  B QL    Assessment      Patient demonstrated appropriate response to Functional Dry Needling. Twitch response with DN to lower multifidi, L4-5 paraspinals, and distal QL. Winding technique used every 5 minutes. Marked improvement in soft tissue quality, pain modulation, and general trunk AROM by end of session.      Home Exercises and Patient Education      Education provided:   Purpose, benefits, and potential side effects of dry needling.   Educated pt to use heat following  "treatment sessions to reduce c/o pain or soreness and to improve circulation to needled sites.   Encouraged pt to continue with HEP to maintain flexibility, ROM, and functional mobility.    Written Handout on response to FDN provided: none provided today.    Blas demonstrated good understanding of the education provided.     See EMR under "Patient Instructions" for exercises provided.    Plan      Monitor response to Functional Dry Needling. Continue with Functional Dry Needling in POC as appropriate.       Jennifer Manley, PT  4/23/2024            " balance/gait deficits to walk better

## 2024-06-05 NOTE — DISCHARGE NOTE NURSING/CASE MANAGEMENT/SOCIAL WORK - PATIENT PORTAL LINK FT
Call out to Dr. Myers about blood pressure results after clonidine given last night.    You can access the FollowMyHealth Patient Portal offered by [facility] by registering at the following website: [email or web address]. By joining [facility]’s FollowMyHealth portal, you will also be able to view your health information using other applications (apps) compatible with our system.